# Patient Record
Sex: MALE | Race: WHITE | NOT HISPANIC OR LATINO | Employment: OTHER | URBAN - METROPOLITAN AREA
[De-identification: names, ages, dates, MRNs, and addresses within clinical notes are randomized per-mention and may not be internally consistent; named-entity substitution may affect disease eponyms.]

---

## 2023-09-28 PROBLEM — L90.5 SCAR: Status: ACTIVE | Noted: 2023-07-18

## 2023-09-28 PROBLEM — C44.41 BASAL CELL CARCINOMA OF SCALP AND SKIN OF NECK: Status: ACTIVE | Noted: 2023-07-18

## 2023-09-28 PROBLEM — F03.90 DEMENTIA (MULTI): Status: ACTIVE | Noted: 2023-09-28

## 2023-09-28 PROBLEM — D18.00 ANGIOMA: Status: ACTIVE | Noted: 2023-07-18

## 2023-09-28 PROBLEM — B02.29 POST HERPETIC NEURALGIA: Status: ACTIVE | Noted: 2023-09-28

## 2023-09-28 PROBLEM — I83.029 VENOUS STASIS ULCER OF LEFT LOWER EXTREMITY (MULTI): Status: ACTIVE | Noted: 2023-09-28

## 2023-09-28 PROBLEM — R80.9 PROTEINURIA: Status: ACTIVE | Noted: 2023-09-28

## 2023-09-28 PROBLEM — L91.8 SKIN TAG: Status: ACTIVE | Noted: 2023-07-18

## 2023-09-28 PROBLEM — L97.929 VENOUS STASIS ULCER OF LEFT LOWER EXTREMITY (MULTI): Status: ACTIVE | Noted: 2023-09-28

## 2023-09-28 PROBLEM — I10 ESSENTIAL HYPERTENSION: Status: ACTIVE | Noted: 2023-09-28

## 2023-09-28 PROBLEM — D49.9 NEOPLASM: Status: ACTIVE | Noted: 2023-07-18

## 2023-09-28 PROBLEM — L85.3 XEROSIS CUTIS: Status: ACTIVE | Noted: 2023-07-18

## 2023-09-28 PROBLEM — D49.2 NEOPLASM OF UNSPECIFIED BEHAVIOR OF BONE, SOFT TISSUE, AND SKIN: Status: ACTIVE | Noted: 2023-07-18

## 2023-09-28 PROBLEM — L81.4 LENTIGINES: Status: ACTIVE | Noted: 2023-07-18

## 2023-09-28 PROBLEM — Z85.828 PERSONAL HISTORY OF OTHER MALIGNANT NEOPLASM OF SKIN: Status: ACTIVE | Noted: 2023-07-18

## 2023-09-28 PROBLEM — D36.7 BENIGN NEOPLASM OF TRUNK: Status: ACTIVE | Noted: 2023-07-18

## 2023-09-28 PROBLEM — I87.2 STASIS DERMATITIS, ACUTE: Status: ACTIVE | Noted: 2023-09-28

## 2023-09-28 PROBLEM — L57.8 SOLAR ELASTOSIS: Status: ACTIVE | Noted: 2023-07-18

## 2023-09-28 PROBLEM — L82.1 OTHER SEBORRHEIC KERATOSIS: Status: ACTIVE | Noted: 2023-07-18

## 2023-09-28 PROBLEM — L82.1 SEBORRHEIC KERATOSIS: Status: ACTIVE | Noted: 2023-07-18

## 2023-09-28 PROBLEM — L90.5 SCAR CONDITION AND FIBROSIS OF SKIN: Status: ACTIVE | Noted: 2023-07-18

## 2023-09-28 PROBLEM — L73.8 OTHER SPECIFIED FOLLICULAR DISORDERS: Status: ACTIVE | Noted: 2023-07-18

## 2023-09-28 PROBLEM — D22.60 MELANOCYTIC NEVI OF UNSPECIFIED UPPER LIMB, INCLUDING SHOULDER: Status: ACTIVE | Noted: 2023-07-18

## 2023-09-28 PROBLEM — R73.03 PREDIABETES: Status: ACTIVE | Noted: 2023-09-28

## 2023-09-28 PROBLEM — D22.5 MELANOCYTIC NEVI OF TRUNK: Status: ACTIVE | Noted: 2023-07-18

## 2023-09-28 PROBLEM — L81.4 OTHER MELANIN HYPERPIGMENTATION: Status: ACTIVE | Noted: 2023-07-18

## 2023-09-28 PROBLEM — D48.5 NEOPLASM OF UNCERTAIN BEHAVIOR OF SKIN: Status: ACTIVE | Noted: 2023-07-18

## 2023-09-28 PROBLEM — B02.23 POST-HERPETIC POLYNEUROPATHY: Status: ACTIVE | Noted: 2023-09-28

## 2023-09-28 PROBLEM — E78.00 HYPERCHOLESTEREMIA: Status: ACTIVE | Noted: 2023-09-28

## 2023-09-28 PROBLEM — L57.0 ACTINIC KERATOSIS: Status: ACTIVE | Noted: 2023-07-18

## 2023-09-28 PROBLEM — I25.10 CAD (CORONARY ARTERY DISEASE): Status: ACTIVE | Noted: 2023-09-28

## 2023-09-28 PROBLEM — L91.8 OTHER HYPERTROPHIC DISORDERS OF THE SKIN: Status: ACTIVE | Noted: 2023-07-18

## 2023-09-28 PROBLEM — C91.10 CLL (CHRONIC LYMPHOCYTIC LEUKEMIA) (MULTI): Status: ACTIVE | Noted: 2023-09-28

## 2023-09-28 PROBLEM — C44.310 BASAL CELL CARCINOMA (BCC) OF SKIN OF FACE: Status: ACTIVE | Noted: 2023-07-18

## 2023-09-28 PROBLEM — Z86.718 HISTORY OF DVT (DEEP VEIN THROMBOSIS): Status: ACTIVE | Noted: 2023-09-28

## 2023-09-28 PROBLEM — D18.01 HEMANGIOMA OF SKIN AND SUBCUTANEOUS TISSUE: Status: ACTIVE | Noted: 2023-07-18

## 2023-09-28 PROBLEM — M19.011 PRIMARY OSTEOARTHRITIS OF RIGHT SHOULDER: Status: ACTIVE | Noted: 2023-09-28

## 2023-09-28 RX ORDER — AMMONIUM LACTATE 12 G/100G
1 CREAM TOPICAL
COMMUNITY
Start: 2018-04-27 | End: 2023-10-03 | Stop reason: ALTCHOICE

## 2023-09-28 RX ORDER — BACLOFEN 5 MG/1
5 TABLET ORAL
COMMUNITY
Start: 2023-02-20 | End: 2023-10-03 | Stop reason: ALTCHOICE

## 2023-09-28 RX ORDER — MULTIVIT WITH IRON,MINERALS
1 TABLET,CHEWABLE ORAL 2 TIMES DAILY
COMMUNITY
End: 2023-10-03 | Stop reason: ALTCHOICE

## 2023-09-28 RX ORDER — DONEPEZIL HYDROCHLORIDE 5 MG/1
1 TABLET, FILM COATED ORAL NIGHTLY
COMMUNITY
Start: 2015-02-04 | End: 2023-12-13

## 2023-09-28 RX ORDER — APIXABAN 2.5 MG/1
1 TABLET, FILM COATED ORAL 2 TIMES DAILY
COMMUNITY
Start: 2021-06-17

## 2023-09-28 RX ORDER — CLOPIDOGREL BISULFATE 75 MG/1
1 TABLET ORAL DAILY
COMMUNITY

## 2023-09-28 RX ORDER — NAPROXEN SODIUM 220 MG/1
TABLET ORAL
COMMUNITY
Start: 2020-06-18

## 2023-09-28 RX ORDER — PRAVASTATIN SODIUM 40 MG/1
40 TABLET ORAL NIGHTLY
COMMUNITY
Start: 2014-02-03

## 2023-09-28 RX ORDER — METOPROLOL SUCCINATE 50 MG/1
1 TABLET, EXTENDED RELEASE ORAL DAILY
COMMUNITY
Start: 2015-11-30

## 2023-09-28 RX ORDER — METOPROLOL SUCCINATE 25 MG/1
TABLET, EXTENDED RELEASE ORAL
COMMUNITY
Start: 2023-01-21

## 2023-09-28 RX ORDER — PREGABALIN 25 MG/1
1 CAPSULE ORAL 2 TIMES DAILY
COMMUNITY
Start: 2022-06-27

## 2023-09-28 RX ORDER — MULTIVIT-MIN/FA/LYCOPEN/LUTEIN .4-300-25
TABLET ORAL
COMMUNITY
Start: 2020-06-18

## 2023-09-28 RX ORDER — EZETIMIBE 10 MG/1
1 TABLET ORAL DAILY
COMMUNITY
Start: 2021-06-17 | End: 2023-10-03 | Stop reason: ALTCHOICE

## 2023-09-28 RX ORDER — LOSARTAN POTASSIUM 50 MG/1
1 TABLET ORAL DAILY
COMMUNITY
Start: 2019-07-17

## 2023-09-28 RX ORDER — HYDROCODONE BITARTRATE AND ACETAMINOPHEN 5; 325 MG/1; MG/1
1 TABLET ORAL 3 TIMES DAILY PRN
COMMUNITY
Start: 2022-06-27

## 2023-09-28 RX ORDER — LIDOCAINE 50 MG/G
PATCH TOPICAL DAILY
COMMUNITY
Start: 2023-02-20 | End: 2023-10-03 | Stop reason: ALTCHOICE

## 2023-09-28 RX ORDER — NITROGLYCERIN 0.3 MG/1
0.3 TABLET SUBLINGUAL AS NEEDED
COMMUNITY
Start: 2016-02-10

## 2023-10-03 ENCOUNTER — OFFICE VISIT (OUTPATIENT)
Dept: DERMATOLOGY | Facility: CLINIC | Age: 88
End: 2023-10-03
Payer: MEDICARE

## 2023-10-03 DIAGNOSIS — C44.311 BASAL CELL CARCINOMA (BCC) OF SKIN OF NOSE: ICD-10-CM

## 2023-10-03 PROCEDURE — 99214 OFFICE O/P EST MOD 30 MIN: CPT | Performed by: DERMATOLOGY

## 2023-10-03 PROCEDURE — 13152 CMPLX RPR E/N/E/L 2.6-7.5 CM: CPT | Performed by: DERMATOLOGY

## 2023-10-03 PROCEDURE — 17311 MOHS 1 STAGE H/N/HF/G: CPT | Performed by: DERMATOLOGY

## 2023-10-03 PROCEDURE — 17312 MOHS ADDL STAGE: CPT | Performed by: DERMATOLOGY

## 2023-10-03 NOTE — PROGRESS NOTES
Mohs Surgery Operative Note    Date of Surgery:  10/3/2023  Surgeon:  Irma Mccrray MD  Office Location:  [address]  [address] [facility]  [address]  [facility] [address]  Dept: [phone]  Dept Fax: [phone]  Referring Provider: Irma Mccrary MD  [address], [address]  [place] [address]      Assessment/Plan   Pre-procedure:   Obtained informed consent: written from patient    Intra-operative:   Audible time out called at : 3:44 PM 10/03/23  by: Patti Moss MA   Verified patient name, birthdate, site, specimen bottle label & requisition.    The planned procedure(s) was again reviewed with the patient. The risks of bleeding, infection, nerve damage and scarring were reviewed. Written authorization was obtained. The patient identify, surgical site, and planned procedure(s) were verified. The provider acted as both surgeon and pathologist.     Basal cell carcinoma (BCC) of skin of nose  Dorsum of Nose    Mohs surgery    Consent obtained: written    Universal Protocol:  Procedure explained and questions answered to patient or proxy's satisfaction: Yes    Test results available and properly labeled: Yes    Pathology report reviewed: Yes    External notes reviewed: Yes    Photo or diagram used for site identification: Yes    Site/side marked: Yes    Slide independently reviewed by Mohs surgeon: Yes    Immediately prior to procedure a time out was called: Yes    Patient identity confirmed: verbally with patient  Preparation: Patient was prepped and draped in usual sterile fashion      Anticoagulation:  Is the patient taking prescription anticoagulant and/or aspirin prescribed/recommended by a physician? No    Was the anticoagulation regimen changed prior to Mohs? No      Anesthesia:  Anesthesia method: local infiltration  Local anesthetic: lidocaine 1.5% WITH epi    Procedure Details:  Biopsy accession number: [id number]  Pre-Op diagnosis: basal  cell carcinoma  Surgical site (from skin exam): Dorsum of Nose  Pre-operative length (cm): 1.1  Pre-operative width (cm): 1.5  Indications for Mohs surgery: anatomic location where tissue conservation is critical  Previously treated? No      Micrographic Surgery Details:  Post-operative length (cm): 2.2  Post-operative width (cm): 1.8  Number of Mohs stages: 2    Stage 1     Comments: The patient was brought into the operating room and placed in the procedure chair in the appropriate position.  The area positive by previous biopsy was identified and confirmed with the patient. The area of clinically obvious tumor was debulked using a curette and/or scalpel as needed. An incision was made following the Mohs approach through the skin. The specimen was taken to the lab, divided into 2 piece(s) and appropriately chromacoded and processed.     Tumor features identified on Mohs section: basal carcinoma      Depth of invasion: dermis    Stage 2     Comments: The area of positivity as noted on the Mohs map in the previous stage was identified and removed using the Mohs technique. The specimen was taken to the lab and appropriately chromacoded and processed in 1 piece(s).     Tumor features identified on Mohs section: no tumor identified  Depth of defect: subcutaneous fat  Patient tolerance of procedure: tolerated well, no immediate complications    Reconstruction:  Was the defect reconstructed? Yes    Was reconstruction performed by the same Mohs surgeon? Yes    Setting of reconstruction: outpatient office  Type of reconstruction: linear  Linear reconstruction: complex  Subcutaneous Layers (Deep Stitches)   Suture size:  5-0  Suture type:  Vicryl  Fine/surface layer approximation (top stitches)   Epidermal/Superficial suture size:  5-0  Epidermal/Superficial suture type:  Fast-absorbing gut  Hemostasis achieved with: suture, pressure and electrodesiccation  Outcome: patient tolerated procedure well with no complications       Staff Communication: Dermatology Local Anesthesia: 1.5 % Lidocaine / Epinephrine - Amount: 4.8cc              The final repair measured 3.2 cm    Wound care was discussed, and the patient was given written post-operative wound care instructions.      The patient will follow up with Irma Mccrary MD as needed for any post operative problems or concerns, and will follow up with their primary dermatologist as scheduled.

## 2023-10-03 NOTE — PROGRESS NOTES
Office Visit Note  Date: 10/3/2023  Surgeon:  Irma Mccrary MD  Office Location: DO [address] [facility] [place]  [address]  [address]  [facility] [address]  Dept: [phone]  Dept Fax: [phone]  Referring Provider: Irma Mccrary MD  [address]   [address]  [place] [address]    Subjective   Munden BRITTANEY Siddiqui is a 88 y.o. male who presents for the following: MOHS Surgery    According to the patient, the lesion has been presnt for approximately 6 months at the time of diagnosis.  The lesion is itchy.  The lesion has not been treated previously.    The patient does not have a pacemaker / defibrillator.      Review of Systems:  No other skin or systemic complaints other than what is documented elsewhere in the note.    MEDICAL HISTORY: clinically relevant history including significant past medical history, medications and allergies was reviewed and documented in Epic.    Objective   Well appearing patient in no apparent distress; mood and affect are within normal limits.  Vital signs: See record.    The patient confirmed the identified site.    Discussion:  The nature of the diagnosis was explained. The lesion is a skin cancer.  It has a risk of local growth and distant spread. The condition is associated with sun exposure.  Warning signs of non-melanoma skin cancer discussed. Patient was instructed to perform monthly self skin examination.  We recommended that the patient have regular full skin exams given an increased risk of subsequent skin cancers. The patient was instructed to use sun protective behaviors including use of broad spectrum sunscreens and sun protective clothing to reduce risk of skin cancers.      Risks, benefits, side effects of Mohs surgery were discussed with patient and the patient voiced understanding.  It was explained that even though the cure rate of Mohs is very high it is not 100%. Risks of surgery including but not limited to  bleeding, infection, numbness, nerve damage, and scar were reviewed.  Discussion included wound care requirements, activity restrictions, likely scar outcome and time to heal.     After Mohs surgery, the defect may need to be repaired surgically and the scar may be longer than the original lesion.  Reconstruction options, risks, and benefits were reviewed including second intention healing, linear repair (4-1 ratio was explained), local flaps, skin grafts, cartilage grafts and interpolation flaps (the need for multiple surgeries was explained). Possible outcomes were reviewed including likely scar appearance, failure of flap survival, infection, bleeding and the need for revision surgery.     The pathology was reviewed, the photograph was reviewed, and the referring physician's note was reviewed.    Patient elected for Mohs surgery.

## 2023-12-12 DIAGNOSIS — F03.90 UNSPECIFIED DEMENTIA, UNSPECIFIED SEVERITY, WITHOUT BEHAVIORAL DISTURBANCE, PSYCHOTIC DISTURBANCE, MOOD DISTURBANCE, AND ANXIETY (MULTI): ICD-10-CM

## 2023-12-13 RX ORDER — DONEPEZIL HYDROCHLORIDE 5 MG/1
5 TABLET, FILM COATED ORAL NIGHTLY
Qty: 90 TABLET | Refills: 0 | Status: SHIPPED | OUTPATIENT
Start: 2023-12-13 | End: 2024-05-01

## 2024-05-01 DIAGNOSIS — F03.90 UNSPECIFIED DEMENTIA, UNSPECIFIED SEVERITY, WITHOUT BEHAVIORAL DISTURBANCE, PSYCHOTIC DISTURBANCE, MOOD DISTURBANCE, AND ANXIETY (MULTI): ICD-10-CM

## 2024-05-01 RX ORDER — DONEPEZIL HYDROCHLORIDE 5 MG/1
5 TABLET, FILM COATED ORAL NIGHTLY
Qty: 90 TABLET | Refills: 0 | Status: SHIPPED | OUTPATIENT
Start: 2024-05-01

## 2024-05-09 ENCOUNTER — OFFICE VISIT (OUTPATIENT)
Dept: PRIMARY CARE | Facility: CLINIC | Age: 89
End: 2024-05-09
Payer: MEDICARE

## 2024-05-09 VITALS
WEIGHT: 189 LBS | TEMPERATURE: 96 F | OXYGEN SATURATION: 96 % | HEART RATE: 50 BPM | BODY MASS INDEX: 28.74 KG/M2 | SYSTOLIC BLOOD PRESSURE: 118 MMHG | DIASTOLIC BLOOD PRESSURE: 70 MMHG

## 2024-05-09 DIAGNOSIS — M79.18 MUSCULOSKELETAL PAIN: Primary | ICD-10-CM

## 2024-05-09 DIAGNOSIS — B02.29 POST HERPETIC NEURALGIA: ICD-10-CM

## 2024-05-09 PROCEDURE — 1036F TOBACCO NON-USER: CPT

## 2024-05-09 PROCEDURE — 99213 OFFICE O/P EST LOW 20 MIN: CPT

## 2024-05-09 PROCEDURE — 1159F MED LIST DOCD IN RCRD: CPT

## 2024-05-09 PROCEDURE — 3074F SYST BP LT 130 MM HG: CPT

## 2024-05-09 PROCEDURE — 3078F DIAST BP <80 MM HG: CPT

## 2024-05-09 RX ORDER — DICLOFENAC SODIUM 10 MG/G
4 GEL TOPICAL 4 TIMES DAILY PRN
Qty: 200 G | Refills: 2 | Status: SHIPPED | OUTPATIENT
Start: 2024-05-09

## 2024-05-09 ASSESSMENT — ENCOUNTER SYMPTOMS
LOSS OF SENSATION IN FEET: 1
ENDOCRINE NEGATIVE: 1
HEADACHES: 0
CARDIOVASCULAR NEGATIVE: 1
FATIGUE: 0
ACTIVITY CHANGE: 0
FEVER: 0
DEPRESSION: 0
ABDOMINAL PAIN: 0
RESPIRATORY NEGATIVE: 1
GASTROINTESTINAL NEGATIVE: 1
WEAKNESS: 0
MUSCULOSKELETAL NEGATIVE: 1
SHORTNESS OF BREATH: 0
OCCASIONAL FEELINGS OF UNSTEADINESS: 0
DECREASED CONCENTRATION: 1
CONFUSION: 1
UNEXPECTED WEIGHT CHANGE: 0
DIZZINESS: 0
ALLERGIC/IMMUNOLOGIC NEGATIVE: 1

## 2024-05-09 NOTE — PROGRESS NOTES
Subjective   Patient ID: Talib Siddiqui is a 89 y.o. male who presents for Establish Care (Talib is here to establish care. He has post herpes neuralgia  and is constant pain, and was told there was nothing to do to help with this.  Medications they did give caused more issues with his dementia. He would like to discuss options to help with pain under the arms and back. ).  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  --trial voltaren gel    Dementia  --patient tells same story three times about his job  --progressing per wife  --continues to drive at this time, it is my professional opinion patient is not safe to drive.      Vitals:    05/09/24 1152   BP: 118/70   Pulse: 50   Temp: 35.6 °C (96 °F)   SpO2: 96%       Review of Systems   Constitutional:  Negative for activity change, fatigue, fever and unexpected weight change.   HENT: Negative.     Respiratory: Negative.  Negative for shortness of breath.    Cardiovascular: Negative.  Negative for chest pain.   Gastrointestinal: Negative.  Negative for abdominal pain.   Endocrine: Negative.    Musculoskeletal: Negative.    Skin: Negative.    Allergic/Immunologic: Negative.    Neurological:  Negative for dizziness, weakness and headaches.   Psychiatric/Behavioral:  Positive for confusion and decreased concentration.        Objective   Physical Exam  Vitals and nursing note reviewed.   Constitutional:       Appearance: Normal appearance.   Cardiovascular:      Rate and Rhythm: Normal rate and regular rhythm.      Pulses: Normal pulses.      Heart sounds: No murmur heard.  Pulmonary:      Effort: Pulmonary effort is normal. No respiratory distress.   Neurological:      Mental Status: He is alert.       Assessment/Plan   Problem List Items Addressed This Visit       Post herpetic neuralgia    Relevant Medications    diclofenac sodium (Voltaren) 1 % gel     Other  Visit Diagnoses       Musculoskeletal pain    -  Primary                 Thank you for coming in today, please call my office if you have any concerns or questions.     Gregory ROBB, CNP

## 2024-05-09 NOTE — PATIENT INSTRUCTIONS
Trial of Voltaren Gel for the PHN  Continue to see the cardiologist    Thank you for coming in today, if any questions or concerns arise, please call my office.   CLARISSE Atwood-CNP

## 2024-08-01 DIAGNOSIS — F03.90 UNSPECIFIED DEMENTIA, UNSPECIFIED SEVERITY, WITHOUT BEHAVIORAL DISTURBANCE, PSYCHOTIC DISTURBANCE, MOOD DISTURBANCE, AND ANXIETY (MULTI): ICD-10-CM

## 2024-08-02 DIAGNOSIS — F03.90 UNSPECIFIED DEMENTIA, UNSPECIFIED SEVERITY, WITHOUT BEHAVIORAL DISTURBANCE, PSYCHOTIC DISTURBANCE, MOOD DISTURBANCE, AND ANXIETY (MULTI): ICD-10-CM

## 2024-08-02 RX ORDER — DONEPEZIL HYDROCHLORIDE 5 MG/1
5 TABLET, FILM COATED ORAL NIGHTLY
Qty: 90 TABLET | Refills: 0 | OUTPATIENT
Start: 2024-08-02

## 2024-08-02 RX ORDER — DONEPEZIL HYDROCHLORIDE 5 MG/1
5 TABLET, FILM COATED ORAL NIGHTLY
Qty: 90 TABLET | Refills: 0 | Status: SHIPPED | OUTPATIENT
Start: 2024-08-02

## 2024-08-12 ENCOUNTER — APPOINTMENT (OUTPATIENT)
Dept: PRIMARY CARE | Facility: CLINIC | Age: 89
End: 2024-08-12
Payer: MEDICARE

## 2024-08-12 VITALS
DIASTOLIC BLOOD PRESSURE: 80 MMHG | TEMPERATURE: 96 F | HEART RATE: 68 BPM | BODY MASS INDEX: 28.14 KG/M2 | OXYGEN SATURATION: 96 % | WEIGHT: 185 LBS | SYSTOLIC BLOOD PRESSURE: 128 MMHG

## 2024-08-12 DIAGNOSIS — B02.29 POST HERPETIC NEURALGIA: Primary | ICD-10-CM

## 2024-08-12 PROCEDURE — 3079F DIAST BP 80-89 MM HG: CPT

## 2024-08-12 PROCEDURE — 3074F SYST BP LT 130 MM HG: CPT

## 2024-08-12 PROCEDURE — 99214 OFFICE O/P EST MOD 30 MIN: CPT

## 2024-08-12 RX ORDER — DICLOFENAC SODIUM 75 MG/1
75 TABLET, DELAYED RELEASE ORAL 2 TIMES DAILY PRN
Qty: 180 TABLET | Refills: 0 | Status: SHIPPED | OUTPATIENT
Start: 2024-08-12 | End: 2024-11-10

## 2024-08-12 ASSESSMENT — ENCOUNTER SYMPTOMS
ACTIVITY CHANGE: 0
ARTHRALGIAS: 1

## 2024-08-12 NOTE — PROGRESS NOTES
Subjective   Patient ID: Talib Siddiqui is a 89 y.o. male who presents for Follow-up (Talib is here for shingles pain. Was seeing pain management, due to the dementia they advised there was not much to do. Did tricyclic which caused hallucinations. The gel does not last long. ).  Patient presents today with son chief complaint - continued PHN    2022 received T6 Nerve block 40mg Kenalog with 2CC of Lidocaine.    Previously did take Lyrica in 2023 last March  He has been denied gabapentin, TCA, SNRI for concern of possible worsening of his dementia, I agree with those findings.  Patient is compliant with taking Voltaren gel, which does help for short intervals  We will start Diclofenac PO and monitor symptoms.        Vitals:    08/12/24 1315   BP: 128/80   Pulse: 68   Temp: 35.6 °C (96 °F)   SpO2: 96%       Review of Systems   Constitutional:  Negative for activity change.   Musculoskeletal:  Positive for arthralgias.       Objective   Physical Exam    Assessment/Plan   Problem List Items Addressed This Visit       Post herpetic neuralgia - Primary    Relevant Medications    diclofenac (Voltaren) 75 mg EC tablet    Other Relevant Orders    Referral to Pain Medicine            Thank you for coming in today, please call my office if you have any concerns or questions.     Gregory ROBB, CNP

## 2024-08-12 NOTE — PATIENT INSTRUCTIONS
Since Voltaren worked well, we will start Diclofenac pills  Continue the gel  STOP Tylenol    See the pain manager Dr. Garrett    Thank you for coming in today, if any questions or concerns arise, please call my office.   CLARISSE Atwood-CNP

## 2024-09-04 ENCOUNTER — OFFICE VISIT (OUTPATIENT)
Dept: PAIN MEDICINE | Facility: CLINIC | Age: 89
End: 2024-09-04
Payer: MEDICARE

## 2024-09-04 ENCOUNTER — PREP FOR PROCEDURE (OUTPATIENT)
Dept: PAIN MEDICINE | Facility: CLINIC | Age: 89
End: 2024-09-04
Payer: MEDICARE

## 2024-09-04 VITALS
RESPIRATION RATE: 22 BRPM | WEIGHT: 185 LBS | DIASTOLIC BLOOD PRESSURE: 64 MMHG | HEIGHT: 67 IN | SYSTOLIC BLOOD PRESSURE: 150 MMHG | BODY MASS INDEX: 29.03 KG/M2 | OXYGEN SATURATION: 98 % | HEART RATE: 57 BPM

## 2024-09-04 DIAGNOSIS — B02.29 POST HERPETIC NEURALGIA: ICD-10-CM

## 2024-09-04 DIAGNOSIS — G58.8 INTERCOSTAL NEURALGIA: Primary | ICD-10-CM

## 2024-09-04 PROCEDURE — 1159F MED LIST DOCD IN RCRD: CPT | Performed by: ANESTHESIOLOGY

## 2024-09-04 PROCEDURE — 99204 OFFICE O/P NEW MOD 45 MIN: CPT | Performed by: ANESTHESIOLOGY

## 2024-09-04 PROCEDURE — 3077F SYST BP >= 140 MM HG: CPT | Performed by: ANESTHESIOLOGY

## 2024-09-04 PROCEDURE — 3078F DIAST BP <80 MM HG: CPT | Performed by: ANESTHESIOLOGY

## 2024-09-04 PROCEDURE — 1036F TOBACCO NON-USER: CPT | Performed by: ANESTHESIOLOGY

## 2024-09-04 PROCEDURE — 1125F AMNT PAIN NOTED PAIN PRSNT: CPT | Performed by: ANESTHESIOLOGY

## 2024-09-04 PROCEDURE — 99214 OFFICE O/P EST MOD 30 MIN: CPT | Performed by: ANESTHESIOLOGY

## 2024-09-04 RX ORDER — EZETIMIBE 10 MG/1
10 TABLET ORAL DAILY
COMMUNITY

## 2024-09-04 ASSESSMENT — ENCOUNTER SYMPTOMS
CONFUSION: 1
BACK PAIN: 1
ACTIVITY CHANGE: 1
ARTHRALGIAS: 1

## 2024-09-04 ASSESSMENT — PAIN SCALES - GENERAL
PAINLEVEL: 9
PAINLEVEL_OUTOF10: 9

## 2024-09-04 ASSESSMENT — LIFESTYLE VARIABLES
HOW MANY STANDARD DRINKS CONTAINING ALCOHOL DO YOU HAVE ON A TYPICAL DAY: PATIENT DOES NOT DRINK
SKIP TO QUESTIONS 9-10: 1
HOW OFTEN DO YOU HAVE A DRINK CONTAINING ALCOHOL: NEVER
AUDIT-C TOTAL SCORE: 0
HOW OFTEN DO YOU HAVE SIX OR MORE DRINKS ON ONE OCCASION: NEVER

## 2024-09-04 ASSESSMENT — PAIN DESCRIPTION - DESCRIPTORS: DESCRIPTORS: BURNING

## 2024-09-04 ASSESSMENT — PAIN - FUNCTIONAL ASSESSMENT: PAIN_FUNCTIONAL_ASSESSMENT: 0-10

## 2024-09-04 ASSESSMENT — PATIENT HEALTH QUESTIONNAIRE - PHQ9
SUM OF ALL RESPONSES TO PHQ9 QUESTIONS 1 & 2: 0
1. LITTLE INTEREST OR PLEASURE IN DOING THINGS: NOT AT ALL
2. FEELING DOWN, DEPRESSED OR HOPELESS: NOT AT ALL

## 2024-09-04 NOTE — PROGRESS NOTES
The patient is an 89-year-old male with right-sided chest wall pain.  The patient developed shingles approximately 3 years ago the pain radiated from the mid thoracic region around the right side under his pectoral muscle to the midline.  The lesions healed for the most part but the patient developed severe burning pain that has persisted.  He has tried a variety of medications including pregabalin, oral and topical diclofenac and over-the-counter topical medications.  These have been ineffective.  Lidocaine patches actually made the pain worse.  He underwent an epidural steroid injection by Kimberly Botello which was ineffective.  His son asked about an intercostal nerve block.    Review of Systems   Constitutional:  Positive for activity change.   Musculoskeletal:  Positive for arthralgias and back pain.   Psychiatric/Behavioral:  Positive for confusion.    All other systems reviewed and are negative.    GENERAL: alert and appropriate, poor memory, in no distress, well-hydrated, well nourished, interactive         SKIN: Faint rash in the T6 distribution on the right         HEAD: normocephalic, no abnormality or lesion noted         EYES: no injection and visual acuity is grossly normal         EARS: external ears normal, no mastoid tenderness         NOSE: external nose normal without rhinorrhea         OROPHARYNX: moist mucus membranes, no tonsillar hypertrophy/exudate, uvula midline and pharynx non-erythematous, lips, teeth and gums are without obvious lesion         NECK: Reduced ROM, no cervical LNs noted         RESPIRATORY: breathing non-labored and no grunting/flaring/retractions         CHEST: equal chest rise with normal respiratory effort         ABDOMEN: soft and non-tender         BACK: back normal in appearance, cervical and lumbar spine with reduced ROM         EXTREMITIES: strength intact         NEUROLOGIC: gait antalgic,  allodynia in the T6 distribution on the right    Assessment and  Plan    -Chronicity--chronic neuropathic pain    -Diagnostics--no new imaging ordered    -Pharmacologic--we may consider duloxetine in the future given its relatively benign side effect profile    -Psychologic--no need for psychologic intervention from my standpoint.  There are no mental health issues of which I am aware that are contributing to the patient's pain.  There are no substance abuse or alcohol abuse issues of which I am aware that are contributing to the patient's pain.    -Physical--we discussed the importance of physical therapy and exercise.  We discussed avoidance and modification techniques.    -Intervention--the patient is a candidate for an intercostal nerve block on the right at the T5, T6 and T7 levels.  I explained the risks benefits and alternatives of the procedure to the patient.  The patient wishes to proceed.    I spent time educating the patient on the condition including the treatment and the prognosis.  I invited the patient to call at anytime with any questions.

## 2024-09-05 ENCOUNTER — ANCILLARY PROCEDURE (OUTPATIENT)
Dept: RADIOLOGY | Facility: EXTERNAL LOCATION | Age: 89
End: 2024-09-05
Payer: MEDICARE

## 2024-09-05 DIAGNOSIS — B02.29 OTHER POSTHERPETIC NERVOUS SYSTEM INVOLVEMENT: ICD-10-CM

## 2024-09-05 PROCEDURE — 64421 NJX AA&/STRD NTRCOST NRV EA: CPT | Performed by: ANESTHESIOLOGY

## 2024-09-05 PROCEDURE — 64420 NJX AA&/STRD NTRCOST NRV 1: CPT | Performed by: ANESTHESIOLOGY

## 2024-09-05 NOTE — PROGRESS NOTES
Pre and postprocedure diagnosis--intercostal neuralgia    Procedure--intercostal nerve blocks on the right at T5, T6 and T7    Anesthesia--local    Complications--none    Clinical note--the patient has a history of pain.  I explained the risks, benefits, and alternatives of the procedure to the patient.  The patient wishes to proceed.    Procedure Note--The patient was brought to the procedure room and placed in prone position.  Sterile prep and drape with ChloraPrep and a fenestrated drape.  10 mL of half percent lidocaine were injected through a 25-gauge spinal needle for local anesthesia.  22-gauge spinal needles were guided to the T5, T6 and T7 intercostal nerves on the right under fluoroscopic guidance.  Contrast was injected under live fluoroscopy to ensure proper needle placement.  Then 40 mg of triamcinolone and 3 mL of half percent bupivacaine were injected through the needles in divided doses.  The needles were removed and the patient was transferred to recovery.

## 2024-10-06 DIAGNOSIS — F03.90 UNSPECIFIED DEMENTIA, UNSPECIFIED SEVERITY, WITHOUT BEHAVIORAL DISTURBANCE, PSYCHOTIC DISTURBANCE, MOOD DISTURBANCE, AND ANXIETY: ICD-10-CM

## 2024-10-08 RX ORDER — DONEPEZIL HYDROCHLORIDE 5 MG/1
5 TABLET, FILM COATED ORAL NIGHTLY
Qty: 90 TABLET | Refills: 0 | Status: SHIPPED | OUTPATIENT
Start: 2024-10-08

## 2024-11-20 ENCOUNTER — OFFICE VISIT (OUTPATIENT)
Dept: PAIN MEDICINE | Facility: CLINIC | Age: 89
End: 2024-11-20
Payer: MEDICARE

## 2024-11-20 VITALS
DIASTOLIC BLOOD PRESSURE: 70 MMHG | WEIGHT: 185 LBS | HEIGHT: 67 IN | RESPIRATION RATE: 22 BRPM | BODY MASS INDEX: 29.03 KG/M2 | OXYGEN SATURATION: 97 % | SYSTOLIC BLOOD PRESSURE: 130 MMHG | HEART RATE: 56 BPM

## 2024-11-20 DIAGNOSIS — G58.8 INTERCOSTAL NEURALGIA: Primary | ICD-10-CM

## 2024-11-20 PROCEDURE — 3075F SYST BP GE 130 - 139MM HG: CPT | Performed by: ANESTHESIOLOGY

## 2024-11-20 PROCEDURE — 1159F MED LIST DOCD IN RCRD: CPT | Performed by: ANESTHESIOLOGY

## 2024-11-20 PROCEDURE — 3078F DIAST BP <80 MM HG: CPT | Performed by: ANESTHESIOLOGY

## 2024-11-20 PROCEDURE — 99417 PROLNG OP E/M EACH 15 MIN: CPT | Performed by: ANESTHESIOLOGY

## 2024-11-20 PROCEDURE — 99214 OFFICE O/P EST MOD 30 MIN: CPT | Performed by: ANESTHESIOLOGY

## 2024-11-20 PROCEDURE — 1125F AMNT PAIN NOTED PAIN PRSNT: CPT | Performed by: ANESTHESIOLOGY

## 2024-11-20 PROCEDURE — 1036F TOBACCO NON-USER: CPT | Performed by: ANESTHESIOLOGY

## 2024-11-20 RX ORDER — DULOXETIN HYDROCHLORIDE 30 MG/1
30 CAPSULE, DELAYED RELEASE ORAL DAILY
Qty: 30 CAPSULE | Refills: 2 | Status: ON HOLD | OUTPATIENT
Start: 2024-11-20 | End: 2025-11-20

## 2024-11-20 ASSESSMENT — ENCOUNTER SYMPTOMS
BACK PAIN: 1
ACTIVITY CHANGE: 1
ARTHRALGIAS: 1

## 2024-11-20 ASSESSMENT — PAIN SCALES - GENERAL
PAINLEVEL_OUTOF10: 8
PAINLEVEL_OUTOF10: 8

## 2024-11-20 ASSESSMENT — PAIN - FUNCTIONAL ASSESSMENT: PAIN_FUNCTIONAL_ASSESSMENT: 0-10

## 2024-11-20 NOTE — PROGRESS NOTES
The patient is an 89-year-old male with pain on the right side of the chest wall.  He has had this pain for at least 3 years.  The patient underwent an epidural steroid injection by another provider which was ineffective.  I did an intercostal nerve block on the right at T5, T6 and T7 which also was ineffective.  He seems to get some relief with use of diclofenac.  He did not respond to low-dose pregabalin.  Amitriptyline caused hallucinations.  He has never tried duloxetine.    Review of Systems   Constitutional:  Positive for activity change.   Musculoskeletal:  Positive for arthralgias, back pain and gait problem.   All other systems reviewed and are negative.    GENERAL: alert and appropriate, in no distress, well-hydrated, well-nourished and happy, smiling, interactive  SKIN: no rash noted  RESPIRATORY: breathing non-labored and no grunting/flaring/retractions  CHEST: equal chest rise with normal respiratory effort  ABDOMEN: soft and non-tender  BACK: back normal in appearance, spine with reduced ROM  EXTREMITIES: strength intact  NEUROLOGIC: gait antalgic, SLR negative, sensation grossly intact.    Assessment and Plan    -Chronicity--chronic neuropathic pain    -Diagnostics--no new imaging ordered    -Pharmacologic--the patient may benefit from duloxetine.  I explained the mechanism of action of this medication as well as the side effect profile.    -Psychologic--no need for psychologic intervention from my standpoint.  There are no mental health issues of which I am aware that are contributing to the patient's pain.  There are no substance abuse or alcohol abuse issues of which I am aware that are contributing to the patient's pain.    -Physical--we discussed the importance of physical therapy and exercise.  We discussed avoidance and modification techniques.    -Intervention--no intervention planned at this time    I spent time educating the patient on the condition including the treatment and the prognosis.   I invited the patient to call at anytime with any questions.

## 2024-11-24 ENCOUNTER — APPOINTMENT (OUTPATIENT)
Dept: CARDIOLOGY | Facility: HOSPITAL | Age: 89
End: 2024-11-24
Payer: MEDICARE

## 2024-11-24 ENCOUNTER — HOSPITAL ENCOUNTER (INPATIENT)
Facility: HOSPITAL | Age: 89
End: 2024-11-24
Attending: FAMILY MEDICINE | Admitting: FAMILY MEDICINE
Payer: MEDICARE

## 2024-11-24 ENCOUNTER — APPOINTMENT (OUTPATIENT)
Dept: RADIOLOGY | Facility: HOSPITAL | Age: 89
End: 2024-11-24
Payer: MEDICARE

## 2024-11-24 VITALS
HEART RATE: 87 BPM | WEIGHT: 172.62 LBS | HEIGHT: 67 IN | BODY MASS INDEX: 27.09 KG/M2 | SYSTOLIC BLOOD PRESSURE: 128 MMHG | TEMPERATURE: 97.9 F | DIASTOLIC BLOOD PRESSURE: 69 MMHG | OXYGEN SATURATION: 94 % | RESPIRATION RATE: 18 BRPM

## 2024-11-24 DIAGNOSIS — R33.9 URINARY RETENTION: ICD-10-CM

## 2024-11-24 DIAGNOSIS — C61 PROSTATE CANCER METASTATIC TO BONE (MULTI): ICD-10-CM

## 2024-11-24 DIAGNOSIS — G89.3 PAIN OF METASTATIC MALIGNANCY: Primary | ICD-10-CM

## 2024-11-24 DIAGNOSIS — C79.51 PROSTATE CANCER METASTATIC TO BONE (MULTI): ICD-10-CM

## 2024-11-24 LAB
ALBUMIN SERPL BCP-MCNC: 4 G/DL (ref 3.4–5)
ALP SERPL-CCNC: 133 U/L (ref 33–136)
ALT SERPL W P-5'-P-CCNC: 22 U/L (ref 10–52)
ANION GAP SERPL CALC-SCNC: 15 MMOL/L (ref 10–20)
APPEARANCE UR: CLEAR
AST SERPL W P-5'-P-CCNC: 44 U/L (ref 9–39)
BASOPHILS # BLD AUTO: 0.08 X10*3/UL (ref 0–0.1)
BASOPHILS NFR BLD AUTO: 0.4 %
BILIRUB SERPL-MCNC: 0.7 MG/DL (ref 0–1.2)
BILIRUB UR STRIP.AUTO-MCNC: NEGATIVE MG/DL
BUN SERPL-MCNC: 17 MG/DL (ref 6–23)
CALCIUM SERPL-MCNC: 9 MG/DL (ref 8.6–10.3)
CARDIAC TROPONIN I PNL SERPL HS: 44 NG/L (ref 0–20)
CARDIAC TROPONIN I PNL SERPL HS: 47 NG/L (ref 0–20)
CHLORIDE SERPL-SCNC: 102 MMOL/L (ref 98–107)
CO2 SERPL-SCNC: 25 MMOL/L (ref 21–32)
COLOR UR: YELLOW
CREAT SERPL-MCNC: 0.96 MG/DL (ref 0.5–1.3)
EGFRCR SERPLBLD CKD-EPI 2021: 76 ML/MIN/1.73M*2
EOSINOPHIL # BLD AUTO: 0.05 X10*3/UL (ref 0–0.4)
EOSINOPHIL NFR BLD AUTO: 0.3 %
ERYTHROCYTE [DISTWIDTH] IN BLOOD BY AUTOMATED COUNT: 13.6 % (ref 11.5–14.5)
GLUCOSE SERPL-MCNC: 95 MG/DL (ref 74–99)
GLUCOSE UR STRIP.AUTO-MCNC: NORMAL MG/DL
HCT VFR BLD AUTO: 37.9 % (ref 41–52)
HGB BLD-MCNC: 12.4 G/DL (ref 13.5–17.5)
IMM GRANULOCYTES # BLD AUTO: 0.06 X10*3/UL (ref 0–0.5)
IMM GRANULOCYTES NFR BLD AUTO: 0.3 % (ref 0–0.9)
INR PPP: 1.3 (ref 0.9–1.1)
KETONES UR STRIP.AUTO-MCNC: NEGATIVE MG/DL
LEUKOCYTE ESTERASE UR QL STRIP.AUTO: NEGATIVE
LYMPHOCYTES # BLD AUTO: 7.53 X10*3/UL (ref 0.8–3)
LYMPHOCYTES NFR BLD AUTO: 42 %
MCH RBC QN AUTO: 30.6 PG (ref 26–34)
MCHC RBC AUTO-ENTMCNC: 32.7 G/DL (ref 32–36)
MCV RBC AUTO: 94 FL (ref 80–100)
MONOCYTES # BLD AUTO: 1.07 X10*3/UL (ref 0.05–0.8)
MONOCYTES NFR BLD AUTO: 6 %
MUCOUS THREADS #/AREA URNS AUTO: ABNORMAL /LPF
NEUTROPHILS # BLD AUTO: 9.14 X10*3/UL (ref 1.6–5.5)
NEUTROPHILS NFR BLD AUTO: 51 %
NITRITE UR QL STRIP.AUTO: NEGATIVE
NRBC BLD-RTO: 0 /100 WBCS (ref 0–0)
PH UR STRIP.AUTO: 5.5 [PH]
PLATELET # BLD AUTO: 210 X10*3/UL (ref 150–450)
POTASSIUM SERPL-SCNC: 4.1 MMOL/L (ref 3.5–5.3)
PROT SERPL-MCNC: 6.6 G/DL (ref 6.4–8.2)
PROT UR STRIP.AUTO-MCNC: ABNORMAL MG/DL
PROTHROMBIN TIME: 14.6 SECONDS (ref 9.8–12.8)
RBC # BLD AUTO: 4.05 X10*6/UL (ref 4.5–5.9)
RBC # UR STRIP.AUTO: ABNORMAL /UL
RBC #/AREA URNS AUTO: >20 /HPF
SODIUM SERPL-SCNC: 138 MMOL/L (ref 136–145)
SP GR UR STRIP.AUTO: 1.03
UROBILINOGEN UR STRIP.AUTO-MCNC: NORMAL MG/DL
WBC # BLD AUTO: 17.9 X10*3/UL (ref 4.4–11.3)
WBC #/AREA URNS AUTO: ABNORMAL /HPF

## 2024-11-24 PROCEDURE — 96376 TX/PRO/DX INJ SAME DRUG ADON: CPT

## 2024-11-24 PROCEDURE — 84484 ASSAY OF TROPONIN QUANT: CPT | Performed by: PHYSICIAN ASSISTANT

## 2024-11-24 PROCEDURE — 85610 PROTHROMBIN TIME: CPT | Performed by: PHYSICIAN ASSISTANT

## 2024-11-24 PROCEDURE — 2500000005 HC RX 250 GENERAL PHARMACY W/O HCPCS: Performed by: NURSE PRACTITIONER

## 2024-11-24 PROCEDURE — 2500000001 HC RX 250 WO HCPCS SELF ADMINISTERED DRUGS (ALT 637 FOR MEDICARE OP): Performed by: NURSE PRACTITIONER

## 2024-11-24 PROCEDURE — 74177 CT ABD & PELVIS W/CONTRAST: CPT

## 2024-11-24 PROCEDURE — 81001 URINALYSIS AUTO W/SCOPE: CPT | Performed by: PHYSICIAN ASSISTANT

## 2024-11-24 PROCEDURE — 2500000005 HC RX 250 GENERAL PHARMACY W/O HCPCS: Performed by: EMERGENCY MEDICINE

## 2024-11-24 PROCEDURE — 99285 EMERGENCY DEPT VISIT HI MDM: CPT | Mod: 25

## 2024-11-24 PROCEDURE — 36415 COLL VENOUS BLD VENIPUNCTURE: CPT | Performed by: PHYSICIAN ASSISTANT

## 2024-11-24 PROCEDURE — 96375 TX/PRO/DX INJ NEW DRUG ADDON: CPT

## 2024-11-24 PROCEDURE — 51798 US URINE CAPACITY MEASURE: CPT

## 2024-11-24 PROCEDURE — 71260 CT THORAX DX C+: CPT | Mod: RCN | Performed by: RADIOLOGY

## 2024-11-24 PROCEDURE — 99223 1ST HOSP IP/OBS HIGH 75: CPT | Performed by: NURSE PRACTITIONER

## 2024-11-24 PROCEDURE — 93005 ELECTROCARDIOGRAM TRACING: CPT

## 2024-11-24 PROCEDURE — 72128 CT CHEST SPINE W/O DYE: CPT | Mod: RCN | Performed by: RADIOLOGY

## 2024-11-24 PROCEDURE — 85025 COMPLETE CBC W/AUTO DIFF WBC: CPT | Performed by: PHYSICIAN ASSISTANT

## 2024-11-24 PROCEDURE — 70450 CT HEAD/BRAIN W/O DYE: CPT

## 2024-11-24 PROCEDURE — 72128 CT CHEST SPINE W/O DYE: CPT | Mod: RCN

## 2024-11-24 PROCEDURE — 72131 CT LUMBAR SPINE W/O DYE: CPT | Mod: RCN

## 2024-11-24 PROCEDURE — 2500000002 HC RX 250 W HCPCS SELF ADMINISTERED DRUGS (ALT 637 FOR MEDICARE OP, ALT 636 FOR OP/ED): Performed by: NURSE PRACTITIONER

## 2024-11-24 PROCEDURE — 51702 INSERT TEMP BLADDER CATH: CPT

## 2024-11-24 PROCEDURE — 2500000004 HC RX 250 GENERAL PHARMACY W/ HCPCS (ALT 636 FOR OP/ED): Performed by: NURSE PRACTITIONER

## 2024-11-24 PROCEDURE — 72125 CT NECK SPINE W/O DYE: CPT

## 2024-11-24 PROCEDURE — 74177 CT ABD & PELVIS W/CONTRAST: CPT | Mod: RCN | Performed by: RADIOLOGY

## 2024-11-24 PROCEDURE — 84450 TRANSFERASE (AST) (SGOT): CPT | Performed by: PHYSICIAN ASSISTANT

## 2024-11-24 PROCEDURE — 87040 BLOOD CULTURE FOR BACTERIA: CPT | Mod: GEALAB | Performed by: PHYSICIAN ASSISTANT

## 2024-11-24 PROCEDURE — 84484 ASSAY OF TROPONIN QUANT: CPT | Performed by: FAMILY MEDICINE

## 2024-11-24 PROCEDURE — 1200000002 HC GENERAL ROOM WITH TELEMETRY DAILY

## 2024-11-24 PROCEDURE — 72125 CT NECK SPINE W/O DYE: CPT | Performed by: RADIOLOGY

## 2024-11-24 PROCEDURE — 72131 CT LUMBAR SPINE W/O DYE: CPT | Mod: RCN | Performed by: RADIOLOGY

## 2024-11-24 PROCEDURE — 2550000001 HC RX 255 CONTRASTS: Performed by: PHYSICIAN ASSISTANT

## 2024-11-24 PROCEDURE — 70450 CT HEAD/BRAIN W/O DYE: CPT | Performed by: RADIOLOGY

## 2024-11-24 PROCEDURE — 2500000004 HC RX 250 GENERAL PHARMACY W/ HCPCS (ALT 636 FOR OP/ED): Performed by: PHYSICIAN ASSISTANT

## 2024-11-24 PROCEDURE — 96374 THER/PROPH/DIAG INJ IV PUSH: CPT

## 2024-11-24 RX ORDER — PRAVASTATIN SODIUM 40 MG/1
40 TABLET ORAL NIGHTLY
Status: DISCONTINUED | OUTPATIENT
Start: 2024-11-24 | End: 2024-11-27 | Stop reason: HOSPADM

## 2024-11-24 RX ORDER — ENOXAPARIN SODIUM 100 MG/ML
40 INJECTION SUBCUTANEOUS EVERY 24 HOURS
Status: DISCONTINUED | OUTPATIENT
Start: 2024-11-24 | End: 2024-11-27 | Stop reason: HOSPADM

## 2024-11-24 RX ORDER — METOPROLOL SUCCINATE 25 MG/1
25 TABLET, EXTENDED RELEASE ORAL DAILY
Status: DISCONTINUED | OUTPATIENT
Start: 2024-11-24 | End: 2024-11-24 | Stop reason: SDUPTHER

## 2024-11-24 RX ORDER — LOSARTAN POTASSIUM 50 MG/1
50 TABLET ORAL DAILY
Status: DISCONTINUED | OUTPATIENT
Start: 2024-11-24 | End: 2024-11-27 | Stop reason: HOSPADM

## 2024-11-24 RX ORDER — DONEPEZIL HYDROCHLORIDE 5 MG/1
5 TABLET, FILM COATED ORAL NIGHTLY
Status: DISCONTINUED | OUTPATIENT
Start: 2024-11-24 | End: 2024-11-27 | Stop reason: HOSPADM

## 2024-11-24 RX ORDER — EZETIMIBE 10 MG/1
10 TABLET ORAL DAILY
Status: DISCONTINUED | OUTPATIENT
Start: 2024-11-24 | End: 2024-11-27 | Stop reason: HOSPADM

## 2024-11-24 RX ORDER — TALC
3 POWDER (GRAM) TOPICAL NIGHTLY
Status: DISCONTINUED | OUTPATIENT
Start: 2024-11-24 | End: 2024-11-27 | Stop reason: HOSPADM

## 2024-11-24 RX ORDER — ONDANSETRON HYDROCHLORIDE 2 MG/ML
4 INJECTION, SOLUTION INTRAVENOUS EVERY 8 HOURS PRN
Status: DISCONTINUED | OUTPATIENT
Start: 2024-11-24 | End: 2024-11-27 | Stop reason: HOSPADM

## 2024-11-24 RX ORDER — ONDANSETRON HYDROCHLORIDE 2 MG/ML
4 INJECTION, SOLUTION INTRAVENOUS ONCE
Status: COMPLETED | OUTPATIENT
Start: 2024-11-24 | End: 2024-11-24

## 2024-11-24 RX ORDER — CLOPIDOGREL BISULFATE 75 MG/1
75 TABLET ORAL DAILY
Status: DISCONTINUED | OUTPATIENT
Start: 2024-11-24 | End: 2024-11-27 | Stop reason: HOSPADM

## 2024-11-24 RX ORDER — METOPROLOL SUCCINATE 50 MG/1
50 TABLET, EXTENDED RELEASE ORAL DAILY
Status: DISCONTINUED | OUTPATIENT
Start: 2024-11-24 | End: 2024-11-27 | Stop reason: HOSPADM

## 2024-11-24 RX ORDER — OXYCODONE HYDROCHLORIDE 5 MG/1
5 TABLET ORAL EVERY 4 HOURS PRN
Status: DISCONTINUED | OUTPATIENT
Start: 2024-11-24 | End: 2024-11-27 | Stop reason: HOSPADM

## 2024-11-24 RX ORDER — POLYETHYLENE GLYCOL 3350 17 G/17G
17 POWDER, FOR SOLUTION ORAL DAILY
Status: DISCONTINUED | OUTPATIENT
Start: 2024-11-24 | End: 2024-11-27 | Stop reason: HOSPADM

## 2024-11-24 RX ORDER — LIDOCAINE HYDROCHLORIDE 20 MG/ML
1 JELLY TOPICAL ONCE
Status: COMPLETED | OUTPATIENT
Start: 2024-11-24 | End: 2024-11-24

## 2024-11-24 RX ORDER — PANTOPRAZOLE SODIUM 40 MG/1
40 TABLET, DELAYED RELEASE ORAL
Status: DISCONTINUED | OUTPATIENT
Start: 2024-11-25 | End: 2024-11-27 | Stop reason: HOSPADM

## 2024-11-24 RX ORDER — MORPHINE SULFATE 4 MG/ML
4 INJECTION INTRAVENOUS ONCE
Status: COMPLETED | OUTPATIENT
Start: 2024-11-24 | End: 2024-11-24

## 2024-11-24 RX ORDER — MORPHINE SULFATE 2 MG/ML
2 INJECTION, SOLUTION INTRAMUSCULAR; INTRAVENOUS EVERY 4 HOURS PRN
Status: DISCONTINUED | OUTPATIENT
Start: 2024-11-24 | End: 2024-11-25

## 2024-11-24 RX ORDER — NITROGLYCERIN 0.4 MG/1
0.4 TABLET SUBLINGUAL AS NEEDED
Status: DISCONTINUED | OUTPATIENT
Start: 2024-11-24 | End: 2024-11-27 | Stop reason: HOSPADM

## 2024-11-24 RX ORDER — OXYCODONE HYDROCHLORIDE 10 MG/1
10 TABLET ORAL EVERY 4 HOURS PRN
Status: DISCONTINUED | OUTPATIENT
Start: 2024-11-24 | End: 2024-11-27 | Stop reason: HOSPADM

## 2024-11-24 RX ORDER — ACETAMINOPHEN 325 MG/1
975 TABLET ORAL EVERY 8 HOURS
Status: DISCONTINUED | OUTPATIENT
Start: 2024-11-24 | End: 2024-11-27 | Stop reason: HOSPADM

## 2024-11-24 RX ADMIN — CLOPIDOGREL 75 MG: 75 TABLET ORAL at 19:00

## 2024-11-24 RX ADMIN — IOHEXOL 75 ML: 350 INJECTION, SOLUTION INTRAVENOUS at 15:15

## 2024-11-24 RX ADMIN — EZETIMIBE 10 MG: 10 TABLET ORAL at 19:00

## 2024-11-24 RX ADMIN — MORPHINE SULFATE 4 MG: 4 INJECTION INTRAVENOUS at 15:28

## 2024-11-24 RX ADMIN — ENOXAPARIN SODIUM 40 MG: 40 INJECTION SUBCUTANEOUS at 21:07

## 2024-11-24 RX ADMIN — PRAVASTATIN SODIUM 40 MG: 40 TABLET ORAL at 21:08

## 2024-11-24 RX ADMIN — LIDOCAINE HYDROCHLORIDE 1 APPLICATION: 20 JELLY TOPICAL at 15:43

## 2024-11-24 RX ADMIN — DONEPEZIL HYDROCHLORIDE 5 MG: 5 TABLET ORAL at 21:07

## 2024-11-24 RX ADMIN — ONDANSETRON 4 MG: 2 INJECTION, SOLUTION INTRAMUSCULAR; INTRAVENOUS at 14:37

## 2024-11-24 RX ADMIN — ONDANSETRON 4 MG: 2 INJECTION, SOLUTION INTRAMUSCULAR; INTRAVENOUS at 15:28

## 2024-11-24 RX ADMIN — METOPROLOL SUCCINATE 50 MG: 50 TABLET, EXTENDED RELEASE ORAL at 19:00

## 2024-11-24 RX ADMIN — ACETAMINOPHEN 975 MG: 325 TABLET ORAL at 19:00

## 2024-11-24 RX ADMIN — Medication 3 MG: at 21:08

## 2024-11-24 SDOH — SOCIAL STABILITY: SOCIAL INSECURITY
WITHIN THE LAST YEAR, HAVE YOU BEEN KICKED, HIT, SLAPPED, OR OTHERWISE PHYSICALLY HURT BY YOUR PARTNER OR EX-PARTNER?: NO

## 2024-11-24 SDOH — SOCIAL STABILITY: SOCIAL INSECURITY: WITHIN THE LAST YEAR, HAVE YOU BEEN HUMILIATED OR EMOTIONALLY ABUSED IN OTHER WAYS BY YOUR PARTNER OR EX-PARTNER?: NO

## 2024-11-24 SDOH — SOCIAL STABILITY: SOCIAL INSECURITY: ARE YOU OR HAVE YOU BEEN THREATENED OR ABUSED PHYSICALLY, EMOTIONALLY, OR SEXUALLY BY ANYONE?: NO

## 2024-11-24 SDOH — SOCIAL STABILITY: SOCIAL INSECURITY: ARE THERE ANY APPARENT SIGNS OF INJURIES/BEHAVIORS THAT COULD BE RELATED TO ABUSE/NEGLECT?: NO

## 2024-11-24 SDOH — SOCIAL STABILITY: SOCIAL INSECURITY: ABUSE: ADULT

## 2024-11-24 SDOH — SOCIAL STABILITY: SOCIAL INSECURITY
WITHIN THE LAST YEAR, HAVE YOU BEEN RAPED OR FORCED TO HAVE ANY KIND OF SEXUAL ACTIVITY BY YOUR PARTNER OR EX-PARTNER?: NO

## 2024-11-24 SDOH — ECONOMIC STABILITY: INCOME INSECURITY: IN THE PAST 12 MONTHS HAS THE ELECTRIC, GAS, OIL, OR WATER COMPANY THREATENED TO SHUT OFF SERVICES IN YOUR HOME?: NO

## 2024-11-24 SDOH — ECONOMIC STABILITY: FOOD INSECURITY: WITHIN THE PAST 12 MONTHS, YOU WORRIED THAT YOUR FOOD WOULD RUN OUT BEFORE YOU GOT THE MONEY TO BUY MORE.: NEVER TRUE

## 2024-11-24 SDOH — SOCIAL STABILITY: SOCIAL INSECURITY: DOES ANYONE TRY TO KEEP YOU FROM HAVING/CONTACTING OTHER FRIENDS OR DOING THINGS OUTSIDE YOUR HOME?: NO

## 2024-11-24 SDOH — ECONOMIC STABILITY: FOOD INSECURITY: WITHIN THE PAST 12 MONTHS, THE FOOD YOU BOUGHT JUST DIDN'T LAST AND YOU DIDN'T HAVE MONEY TO GET MORE.: NEVER TRUE

## 2024-11-24 SDOH — SOCIAL STABILITY: SOCIAL INSECURITY: WERE YOU ABLE TO COMPLETE ALL THE BEHAVIORAL HEALTH SCREENINGS?: YES

## 2024-11-24 SDOH — SOCIAL STABILITY: SOCIAL INSECURITY: DO YOU FEEL ANYONE HAS EXPLOITED OR TAKEN ADVANTAGE OF YOU FINANCIALLY OR OF YOUR PERSONAL PROPERTY?: NO

## 2024-11-24 SDOH — SOCIAL STABILITY: SOCIAL INSECURITY: WITHIN THE LAST YEAR, HAVE YOU BEEN AFRAID OF YOUR PARTNER OR EX-PARTNER?: NO

## 2024-11-24 SDOH — SOCIAL STABILITY: SOCIAL INSECURITY: DO YOU FEEL UNSAFE GOING BACK TO THE PLACE WHERE YOU ARE LIVING?: NO

## 2024-11-24 SDOH — SOCIAL STABILITY: SOCIAL INSECURITY: HAVE YOU HAD THOUGHTS OF HARMING ANYONE ELSE?: NO

## 2024-11-24 SDOH — SOCIAL STABILITY: SOCIAL INSECURITY: HAS ANYONE EVER THREATENED TO HURT YOUR FAMILY OR YOUR PETS?: NO

## 2024-11-24 SDOH — SOCIAL STABILITY: SOCIAL INSECURITY: HAVE YOU HAD ANY THOUGHTS OF HARMING ANYONE ELSE?: NO

## 2024-11-24 ASSESSMENT — ACTIVITIES OF DAILY LIVING (ADL)
DRESSING YOURSELF: INDEPENDENT
ASSISTIVE_DEVICE: NONE;CANE
WALKS IN HOME: INDEPENDENT
TOILETING: INDEPENDENT
GROOMING: INDEPENDENT
HEARING - RIGHT EAR: DIFFICULTY WITH NOISE
JUDGMENT_ADEQUATE_SAFELY_COMPLETE_DAILY_ACTIVITIES: NO
HEARING - LEFT EAR: DIFFICULTY WITH NOISE
LACK_OF_TRANSPORTATION: NO
ADEQUATE_TO_COMPLETE_ADL: YES
PATIENT'S MEMORY ADEQUATE TO SAFELY COMPLETE DAILY ACTIVITIES?: NO
BATHING: NEEDS ASSISTANCE
FEEDING YOURSELF: INDEPENDENT

## 2024-11-24 ASSESSMENT — PAIN SCALES - GENERAL
PAINLEVEL_OUTOF10: 7
PAINLEVEL_OUTOF10: 3
PAINLEVEL_OUTOF10: 7
PAINLEVEL_OUTOF10: 3
PAINLEVEL_OUTOF10: 7
PAINLEVEL_OUTOF10: 3

## 2024-11-24 ASSESSMENT — LIFESTYLE VARIABLES
HOW OFTEN DO YOU HAVE A DRINK CONTAINING ALCOHOL: NEVER
AUDIT-C TOTAL SCORE: 0
HOW MANY STANDARD DRINKS CONTAINING ALCOHOL DO YOU HAVE ON A TYPICAL DAY: PATIENT DOES NOT DRINK
EVER HAD A DRINK FIRST THING IN THE MORNING TO STEADY YOUR NERVES TO GET RID OF A HANGOVER: NO
HOW OFTEN DO YOU HAVE 6 OR MORE DRINKS ON ONE OCCASION: NEVER
AUDIT-C TOTAL SCORE: 0
TOTAL SCORE: 0
EVER FELT BAD OR GUILTY ABOUT YOUR DRINKING: NO
HAVE PEOPLE ANNOYED YOU BY CRITICIZING YOUR DRINKING: NO
HAVE YOU EVER FELT YOU SHOULD CUT DOWN ON YOUR DRINKING: NO
SKIP TO QUESTIONS 9-10: 1

## 2024-11-24 ASSESSMENT — COGNITIVE AND FUNCTIONAL STATUS - GENERAL
PATIENT BASELINE BEDBOUND: NO
DAILY ACTIVITIY SCORE: 24
MOBILITY SCORE: 24

## 2024-11-24 ASSESSMENT — ENCOUNTER SYMPTOMS
ABDOMINAL DISTENTION: 1
BACK PAIN: 1
DIFFICULTY URINATING: 1
HEMATURIA: 1
ABDOMINAL PAIN: 1

## 2024-11-24 ASSESSMENT — PAIN - FUNCTIONAL ASSESSMENT: PAIN_FUNCTIONAL_ASSESSMENT: 0-10

## 2024-11-24 ASSESSMENT — COLUMBIA-SUICIDE SEVERITY RATING SCALE - C-SSRS
1. IN THE PAST MONTH, HAVE YOU WISHED YOU WERE DEAD OR WISHED YOU COULD GO TO SLEEP AND NOT WAKE UP?: NO
2. HAVE YOU ACTUALLY HAD ANY THOUGHTS OF KILLING YOURSELF?: NO
6. HAVE YOU EVER DONE ANYTHING, STARTED TO DO ANYTHING, OR PREPARED TO DO ANYTHING TO END YOUR LIFE?: NO

## 2024-11-24 ASSESSMENT — PATIENT HEALTH QUESTIONNAIRE - PHQ9
1. LITTLE INTEREST OR PLEASURE IN DOING THINGS: NOT AT ALL
2. FEELING DOWN, DEPRESSED OR HOPELESS: NOT AT ALL
SUM OF ALL RESPONSES TO PHQ9 QUESTIONS 1 & 2: 0

## 2024-11-24 NOTE — ED PROVIDER NOTES
HPI   Chief Complaint   Patient presents with    multiple medical complaints     Abdominal pain, back pain, shoulder pain, chest pain. Denies nausea/vomiting/sob       History of present illness:  89-year-old male presents emergency room for complaints of multiple complaints.  The patient states he began having pain in his abdomen and his lower back and in his shoulders bilaterally couple days ago.  He denies any falls or trauma or injuries.  He states he is unsure what is causing this and states he has any nausea or vomiting or change in bowel habits.  He is companied by his son who provides more history stating that his father unfortunately does have a history of dementia and does live by himself.  He states that the patient is on Plavix and aspirin as well and has a history o coronary artery disease and had a triple bypass about 15 years ago he also has a history of CLL which is in remission as of about 15 years ago.  He states he also takes medications for acid reflux as well as pain medications for postherpetic neuralgia.  He states that he sees his father on a daily basis as he lives nearby and visits him to make sure that he is taking his meds and that he has the house cared for.  He states that when he went to visit him today his father was complaining with the symptoms and states he did not see anything about yesterday.  He states the patient has been ambulating otherwise and denies any other symptoms.  The patient denies any other symptoms at this time.    Social history: Negative for alcohol and drug use.    Review of systems:   Gen.: No weight loss, fatigue, anorexia, insomnia, fever.   Eyes: No vision loss, double vision, drainage, eye pain.   ENT: No pharyngitis, neck pain, headache  Cardiac: No  palpitations, near syncope, syncope  Pulmonary: No shortness of breath, cough, hemoptysis.   Heme/lymph: No swollen glands, fever, bleeding.   GI: No abdominal pain, change in bowel habits, melena,  hematemesis, hematochezia, nausea, vomiting, diarrhea.   : No discharge, dysuria, frequency, urgency, hematuria.   Musculoskeletal: No  joint swelling.   Skin: No rashes.   Review of systems is otherwise negative unless stated above or in history of present illness.        Physical exam:  General: Vitals noted, no distress. Afebrile.   EENT: No lymphadenopathy appreciated  Cardiac: Regular, rate, rhythm, no murmur.   Pulmonary: Lungs clear bilaterally with good aeration. No adventitious breath sounds.   Abdomen: Soft, nonsurgical. Pain to palpation over the suprapubic area  peritoneal signs. Normoactive bowel sounds.   Extremities: No peripheral edema.  No pain to palpation across the limbs, the patient is able to ambulate with difficulty  Skin: No rash.   Neuro: No focal neurologic deficits      Medical decision making:   Testing: CBC CMP troponin x 2 urinalysis INR: First troponin is 44-second troponin 47 CMP unremarkable CBC shows white count 17.9 INR 1.3 urinalysis shows greater than 20 red blood cells but no other acute findings in particular no infection, CT scan of head cervical spine thoracic spine lumbar spine chest abdomen pelvis shows unfortunately metastatic lesions consistent with prostate cancer throughout the bones in the ribs as well as in the spine and in the pelvis.  As interpreted by radiology      EKG taken on November 24, 2024 1249 shows normal sinus rhythm at 82, no T wave inversion no STEMI no ST depression    Treatment: IV morphine and IV Zofran given for comfort  Reevaluation:   Plan: 89-year-old male presents emergency room for complaints of multiple complaints.  The patient states he began having pain in his abdomen and his lower back and in his shoulders bilaterally couple days ago.  He denies any falls or trauma or injuries.  He states he is unsure what is causing this and states he has any nausea or vomiting or change in bowel habits.  He is companied by his son who provides more  history stating that his father unfortunately does have a history of dementia and does live by himself.  He states that the patient is on Plavix and aspirin as well and has a history o coronary artery disease and had a triple bypass about 15 years ago he also has a history of CLL which is in remission as of about 15 years ago.  He states he also takes medications for acid reflux as well as pain medications for postherpetic neuralgia.  He states that he sees his father on a daily basis as he lives nearby and visits him to make sure that he is taking his meds and that he has the house cared for.  He states that when he went to visit him today his father was complaining with the symptoms and states he did not see anything about yesterday.  He states the patient has been ambulating otherwise and denies any other symptoms.  The patient denies any other symptoms at this time.Cardiac: Regular, rate, rhythm, no murmur.   Pulmonary: Lungs clear bilaterally with good aeration. No adventitious breath sounds.   Abdomen: Soft, nonsurgical. No Patient complains of pain to palpation across the suprapubic area  peritoneal signs. Normoactive bowel sounds.   Extremities: No peripheral edema.  No pain to palpation across the limbs, the patient is able to ambulate with difficulty.  Bladder scan which was performed showed over 800 cc of urine present in the bladder and Narayanan catheter was easily placed and 800 cc of urine was easily removed I explained to the patient's son at bedside the test results at this time we discussed possibility of patient going home and following up in outpatient setting versus being admitted for pain control.  He requested that the patient be admitted at this time for further care and pain control the patient was agreeable this plan as well.  He explained to me they also want to reach out to family members as well to have a discussion with them about possible treatment plans.  I spoke with the hospitalist team  who is in agreement this plan the patient is admitted to their care at this time for pain management.     Impression:   1.  Urinary retention  2.  Stage IV prostate cancer          History provided by:  Patient   used: No            Patient History   Past Medical History:   Diagnosis Date    Anesthesia of skin 12/18/2014    Hand numbness    Arthritis     Body mass index (BMI) 34.0-34.9, adult 01/02/2020    Body mass index (BMI) of 34.0 to 34.9 in adult    Calculus of bile duct without cholangitis or cholecystitis without obstruction 07/09/2014    Gall stones, common bile duct    Cancer (Multi)     Cellulitis of unspecified part of limb 10/16/2020    Cellulitis of leg, except foot    Dementia     Encounter for immunization 01/02/2020    Influenza vaccine administered    Encounter for immunization 08/10/2021    Need for Tdap vaccination    Esophageal obstruction     Esophageal stricture    Nonscarring hair loss, unspecified 02/10/2016    Hair loss    Other conditions influencing health status 04/25/2019    History of cough    Personal history of diseases of the blood and blood-forming organs and certain disorders involving the immune mechanism 06/09/2022    History of iron deficiency anemia    Personal history of other diseases of the digestive system     History of esophageal reflux    Personal history of other diseases of the musculoskeletal system and connective tissue 05/31/2018    History of back pain    Personal history of other diseases of the nervous system and sense organs 03/13/2015    History of carpal tunnel syndrome    Personal history of other infectious and parasitic diseases 06/09/2022    History of herpes zoster    Polyp of colon 05/31/2017    Benign colonic polyp    Radiculopathy, lumbar region 12/18/2014    Lumbar radiculopathy    Rash and other nonspecific skin eruption 02/19/2022    Rash, skin    Tinnitus     Unspecified abdominal pain 06/26/2014    Abdominal pain of multiple  sites    Unspecified malignant neoplasm of skin of unspecified part of face 01/02/2020    Skin cancer of face     Past Surgical History:   Procedure Laterality Date    APPENDECTOMY  10/10/2013    Appendectomy    CARPAL TUNNEL RELEASE  05/05/2015    Neuroplasty Decompression Median Nerve At Carpal Tunnel    CHOLECYSTECTOMY  12/17/2014    Cholecystectomy Laparoscopic    COLONOSCOPY  06/24/2014    Complete Colonoscopy    CORONARY ARTERY BYPASS GRAFT  06/23/2014    CABG    GALLBLADDER SURGERY      OTHER SURGICAL HISTORY  06/02/2020    Upper Gastrointestinal Endoscopy, Simple Primary Exam    OTHER SURGICAL HISTORY  06/23/2014    Diagnostic Esophagoscopy Transoral Flexible With Biopsy    TONSILLECTOMY  10/10/2013    Tonsillectomy     Family History   Problem Relation Name Age of Onset    Dementia Mother      Colon cancer Brother      Alzheimer's disease Brother      Sarcoidosis Brother      Heart disease Son      Heart disease Other       Social History     Tobacco Use    Smoking status: Former     Types: Cigarettes    Smokeless tobacco: Never   Vaping Use    Vaping status: Never Used   Substance Use Topics    Alcohol use: Never    Drug use: Never       Physical Exam   ED Triage Vitals [11/24/24 1252]   Temp Heart Rate Resp BP   36.7 °C (98.1 °F) 80 18 168/69      SpO2 Temp Source Heart Rate Source Patient Position   97 % Skin Monitor Lying      BP Location FiO2 (%)     Right arm --       Physical Exam      ED Course & MDM   ED Course as of 11/24/24 1836   Sun Nov 24, 2024   1539 Patient had very marked discomfort when I palpate over his bladder and appears distended.  Bladder scan done by the nurse showed over 500 cc of urine.  Patient will be given a Uro-Jet and a catheter will be placed.  I suspect urinary tension is a part of his discomfort. [RZ]   1607 Over 700 cc of urine came out.  Patient appears more comfortable. 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

## 2024-11-24 NOTE — ED TRIAGE NOTES
Patient here for multiple medical complaints Abdominal pain, back pain, shoulder pain, chest pain. Denies nausea/vomiting/sob

## 2024-11-24 NOTE — PROGRESS NOTES
The patient was seen by the midlevel/resident.  I have personally saw the patient and made/approved the management plan and take responsibility for the patient management.  I reviewed the EKG's (when done) and agree with the interpretation.  I have seen and examined the patient; agree with the workup, evaluation, MDM, and diagnosis.  The care plan has been discussed with the midlevel/resident; I have reviewed the note and agree with the documented findings.       Patient reports marked discomfort over his groin up to his top of the abdomen.  Most of his discomfort appears to be lower.  He has a history of cancer in the past CLL which is in remission.  He is unable describe exact with going on but that is a constant discomfort low down.  Nurse reports has been trying to urinate here in the ED he has been unable.  We worked up with CT scans and labs.  Concern for possible prostate cancer with mets.  Patient was urinary retention.  Patient did well with the Narayanan putting out over 700 cc of urine.  Appears more comfortable.  Will be hospitalized and further worked up.  Talk to patient and son at bedside.  ED Course as of 11/24/24 1747   Sun Nov 24, 2024   1539 Patient had very marked discomfort when I palpate over his bladder and appears distended.  Bladder scan done by the nurse showed over 500 cc of urine.  Patient will be given a Uro-Jet and a catheter will be placed.  I suspect urinary tension is a part of his discomfort. [RZ]   1607 Over 700 cc of urine came out.  Patient appears more comfortable. 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

## 2024-11-24 NOTE — H&P
History Of Present Illness  Talib Siddiqui is a 89 y.o. male with history of basal cell carcinoma, CAD, chronic pain, CLL, DVT, HTN and dementia, who presented to the ED with complaints of severe pain of the back, groin and abdomen.  The patient is alert and oriented to self and unable to provide any reliable health history. His son reports that a couple days ago he started complaining of torso and abdominal pain.  Last night, the pain became worse and he was unable to sleep.  He presented to the ED when the pain worsened.  Nursing noted that the patient was unable to void and straight cathed for 700 cc of urine.  Pertinent blood work: WBC 17,900, AST 44 and troponin 44-47.  The EKG shows no ST elevation.  The CT abdomen shows extensive sclerotic metastatic lesions of the bones, primary possibly the prostate, mild hydronephrosis bilaterally, and multiple enlarged pelvic lymph nodes.  The patient has a history of CLL in remission.  Narayanan catheter inserted for urinary retention.  Urinalysis is positive for blood.  The patient is being admitted for oncology evaluation and recommendations, pain management and palliative care consult.    Past Medical History  Basal cell carcinoma  CAD  CLL  Dementia  DVT  Hypertension    Surgical History  Past Surgical History:   Procedure Laterality Date    APPENDECTOMY  10/10/2013    Appendectomy    CARPAL TUNNEL RELEASE  05/05/2015    Neuroplasty Decompression Median Nerve At Carpal Tunnel    CHOLECYSTECTOMY  12/17/2014    Cholecystectomy Laparoscopic    COLONOSCOPY  06/24/2014    Complete Colonoscopy    CORONARY ARTERY BYPASS GRAFT  06/23/2014    CABG    GALLBLADDER SURGERY      OTHER SURGICAL HISTORY  06/02/2020    Upper Gastrointestinal Endoscopy, Simple Primary Exam    OTHER SURGICAL HISTORY  06/23/2014    Diagnostic Esophagoscopy Transoral Flexible With Biopsy    TONSILLECTOMY  10/10/2013    Tonsillectomy        Social History  He reports that he has quit smoking. His smoking  "use included cigarettes. He has never used smokeless tobacco. He reports that he does not drink alcohol and does not use drugs.    Family History  Family History   Problem Relation Name Age of Onset    Dementia Mother      Colon cancer Brother      Alzheimer's disease Brother      Sarcoidosis Brother      Heart disease Son      Heart disease Other          Allergies  Tricyclic antidepressants and tricyclic compounds, Amitriptyline, Donepezil, Lisinopril, Penicillins, Sulfa (sulfonamide antibiotics), Meloxicam, Piperacillin-tazobactam, and Sulfamethoxazole    Review of Systems   Gastrointestinal:  Positive for abdominal distention and abdominal pain.   Genitourinary:  Positive for difficulty urinating and hematuria.   Musculoskeletal:  Positive for back pain.        Physical Exam  Eyes:      Extraocular Movements: Extraocular movements intact.   Cardiovascular:      Rate and Rhythm: Regular rhythm.   Abdominal:      Tenderness: There is generalized abdominal tenderness.   Neurological:      Mental Status: He is alert.      Comments: Alert to self only  Cognitive impairment          Last Recorded Vitals  Blood pressure 154/76, pulse 84, temperature 36.7 °C (98.1 °F), temperature source Skin, resp. rate 15, height 1.702 m (5' 7\"), weight 83.9 kg (185 lb), SpO2 (!) 93%.    Relevant Results      CBC and Auto Differential        Component Value Flag Ref Range Units Status    WBC 17.9      4.4 - 11.3 x10*3/uL Final    nRBC 0.0      0.0 - 0.0 /100 WBCs Final    RBC 4.05      4.50 - 5.90 x10*6/uL Final    Hemoglobin 12.4      13.5 - 17.5 g/dL Final    Hematocrit 37.9      41.0 - 52.0 % Final    MCV 94      80 - 100 fL Final    MCH 30.6      26.0 - 34.0 pg Final    MCHC 32.7      32.0 - 36.0 g/dL Final    RDW 13.6      11.5 - 14.5 % Final    Platelets 210      150 - 450 x10*3/uL Final    Neutrophils % 51.0      40.0 - 80.0 % Final    Immature Granulocytes %, Automated 0.3      0.0 - 0.9 % Final    Comment:    Immature " Granulocyte Count (IG) includes promyelocytes, myelocytes and metamyelocytes but does not include bands. Percent differential counts (%) should be interpreted in the context of the absolute cell counts (cells/UL).    Lymphocytes % 42.0      13.0 - 44.0 % Final    Monocytes % 6.0      2.0 - 10.0 % Final    Eosinophils % 0.3      0.0 - 6.0 % Final    Basophils % 0.4      0.0 - 2.0 % Final    Neutrophils Absolute 9.14      1.60 - 5.50 x10*3/uL Final    Comment:    Percent differential counts (%) should be interpreted in the context of the absolute cell counts (cells/uL).    Immature Granulocytes Absolute, Automated 0.06      0.00 - 0.50 x10*3/uL Final    Lymphocytes Absolute 7.53      0.80 - 3.00 x10*3/uL Final    Monocytes Absolute 1.07      0.05 - 0.80 x10*3/uL Final    Eosinophils Absolute 0.05      0.00 - 0.40 x10*3/uL Final    Basophils Absolute 0.08      0.00 - 0.10 x10*3/uL Final                  Comprehensive metabolic panel        Component Value Flag Ref Range Units Status    Glucose 95      74 - 99 mg/dL Final    Sodium 138      136 - 145 mmol/L Final    Potassium 4.1      3.5 - 5.3 mmol/L Final    Chloride 102      98 - 107 mmol/L Final    Bicarbonate 25      21 - 32 mmol/L Final    Anion Gap 15      10 - 20 mmol/L Final    Urea Nitrogen 17      6 - 23 mg/dL Final    Creatinine 0.96      0.50 - 1.30 mg/dL Final    eGFR 76      >60 mL/min/1.73m*2 Final    Comment:    Calculations of estimated GFR are performed using the 2021 CKD-EPI Study Refit equation without the race variable for the IDMS-Traceable creatinine methods.  https://jasn.asnjournals.org/content/early/2021/09/22/ASN.6567335222    Calcium 9.0      8.6 - 10.3 mg/dL Final    Albumin 4.0      3.4 - 5.0 g/dL Final    Alkaline Phosphatase 133      33 - 136 U/L Final    Total Protein 6.6      6.4 - 8.2 g/dL Final    AST 44      9 - 39 U/L Final    Bilirubin, Total 0.7      0.0 - 1.2 mg/dL Final    ALT 22      10 - 52 U/L Final    Comment:    Patients  treated with Sulfasalazine may generate falsely decreased results for ALT.                  Troponin I, High Sensitivity        Component Value Flag Ref Range Units Status    Troponin I, High Sensitivity 44      0 - 20 ng/L Final                  CT head wo IV contrast          Interpreted By:  Schoenberger, Joseph,   STUDY:  CT HEAD WO IV CONTRAST;  11/24/2024 3:12 pm      INDICATION:  Signs/Symptoms:headache.          COMPARISON:  None.      ACCESSION NUMBER(S):  [id number]      ORDERING CLINICIAN:  ALEKSANDER COLEMAN      TECHNIQUE:  Noncontrast axial CT scan of head was performed. Angled reformats in  brain and bone windows were generated. The images were reviewed in  bone, brain, blood and soft tissue windows.      FINDINGS:  CSF Spaces: Enlarged due to parenchymal volume loss. Normal  configuration with attack basal cisterns. There is no extraaxial  fluid collection.      Parenchyma:  The grey-white differentiation is intact. There is no  mass effect or midline shift.  There is no intracranial hemorrhage.      Calvarium: The calvarium is unremarkable.      Paranasal sinuses and mastoids: Visualized paranasal sinuses and  mastoids are clear.      IMPRESSION:  No evidence of acute cortical infarct or intracranial hemorrhage.      No evidence of intracranial hemorrhage or displaced skull fracture.      MACRO:  None      Signed by: Joseph Schoenberger 11/24/2024 3:24 PM  Dictation workstation:   [id number]         CT cervical spine wo IV contrast          Interpreted By:  Schoenberger, Joseph,   STUDY:  CT CERVICAL SPINE WO IV CONTRAST;  11/24/2024 3:12 pm      INDICATION:  Signs/Symptoms:neck pain.          COMPARISON:  None.      ACCESSION NUMBER(S):  [id number]      ORDERING CLINICIAN:  ALEKSANDER COLEMAN      TECHNIQUE:  Axial CT images of the cervical spine are obtained. Axial, coronal  and sagittal reconstructions are provided for review.      FINDINGS:  There are multiple indistinctly marginated sclerotic  lesions  involving both posterior elements and vertebral bodies in the  cervical and upper thoracic spine which are suggestive of blastic  metastatic lesions. In a male of this age this would typically be  associated with prostate cancer.      Fractures: There is no evidence for an acute fracture of the cervical  spine.      Vertebral Alignment: Within normal limits.      Craniocervical Junction: The odontoid process and craniocervical  junction are intact.      Vertebrae/Disc Spaces:  All cervical vertebra are maintained in  height without vertebral body fracture. There is multilevel moderate  degenerative disc narrowing and mild discogenic endplate spurring.  There is severe degenerative narrowing of the C5-C6 disc with some  discogenic endplate and uncinate spurring resulting in foraminal  encroachment.      Prevertebral/Paraspinal Soft Tissues: The prevertebral and paraspinal  soft tissues are unremarkable.      Posterior elements are aligned normally without fracture or  subluxation and relatively mild multilevel facet arthrosis.      IMPRESSION:  No acute fracture or subluxation.      Multifocal blastic metastatic disease the most likely explanation for  which is metastatic disease from prostate carcinoma.      MACRO:  None      Signed by: Joseph Schoenberger 11/24/2024 3:28 PM  Dictation workstation:   [id number]         CT chest abdomen pelvis w IV contrast          STUDY:  CT Chest, Abdomen, and Pelvis with IV Contrast, CT Thoracic Spine and  Lumbar Spine without IV Contrast; 11/24/2024 at 3:12 PM  INDICATION:  Mid back pain with radiation down to abdomen and into groin on left  side, persistent x2 days.   Additional History: Dementia.  COMPARISON:  CT abdomen and pelvis 10/12/16. Correlation with XR chest 04/29/21,  04/26/21, 04/25/19.  ACCESSION NUMBER(S):  [id number], [id number], [id number]  ORDERING CLINICIAN:  ALEKSANDER COLEMAN  TECHNIQUE:  CT of the chest, abdomen, and pelvis was performed.   Contiguous axial  images were obtained at 3 mm slice thickness through the chest,  abdomen, and pelvis.  Coronal and sagittal reconstructions at 3 mm  slice thickness were performed.  Omnipaque-350 75 mL was administered  intravenously.  Please note that spinal images were generated from the  original CT abdomen and pelvis imaging.   FINDINGS:  CHEST:  MEDIASTINUM:  Heart is enlarged.  Severe coronary artery calcifications.  Central  vascular structures opacify normally.    LUNGS/PLEURA:  There is no pleural effusion, pleural thickening, or pneumothorax.   The airways are patent.  Lungs are clear without consolidation, interstitial disease, or  suspicious nodules.  LYMPH NODES:  Thoracic lymph nodes are not enlarged.  ABDOMEN:     LIVER:  No hepatomegaly.  Smooth surface contour.  Mild fatty infiltration of  the liver.  Calcified hepatosplenic granulomata.     BILE DUCTS:  No intrahepatic or extrahepatic biliary ductal dilatation.  Mild  pneumobilia.     GALLBLADDER:  The gallbladder is absent.  STOMACH:  No abnormalities identified.     PANCREAS:  No masses or ductal dilatation.     SPLEEN:  No splenomegaly or focal splenic lesion.     ADRENAL GLANDS:  No thickening or nodules.     KIDNEYS AND URETERS:  Kidneys are normal in size and location.  No renal or ureteral  calculi.  Mild bilateral hydroureteronephrosis without a renal or  ureteral calculus extending down to the urinary bladder, nonspecific  in the setting of a distended urinary bladder     PELVIS:     BLADDER:  Urinary bladder is distended.  Anterior bladder diverticulum noted     REPRODUCTIVE ORGANS:  Prostate gland is top normal in size     BOWEL:  No abnormalities identified.     VESSELS:  No abnormalities identified.  Abdominal aorta is normal in caliber.   Moderate plaque along the distal aorta.     PERITONEUM/RETROPERITONEUM/LYMPH NODES:  No free fluid.  No pneumoperitoneum.  Multiple enlarged retroperitoneal lymph nodes measuring up to 1.9 cm  in  the left periaortic region.  Additional enlarged bilateral pelvic  lymph nodes measuring up to 2 cm along the left external iliac kierra  chain.     ABDOMINAL WALL:  No abnormalities identified.  SOFT TISSUES:   No abnormalities identified.     BONES:  Extensive scattered sclerotic osseous metastatic lesions throughout  the visualized bony structures.  No acute fracture.  THORACIC SPINE:  The alignment is anatomic.  There is no fracture or traumatic  subluxation.  Extensive sclerotic osseous metastatic disease involving  the thoracic spine.  The vertebral body heights are well maintained.  Moderate disc space  narrowing throughout the thoracic spine with accompanying anterior  marginal osteophytes.  No significant central canal stenosis is  demonstrated.  The neural foramina are patent throughout.    The paravertebral soft tissues are within normal limits.  LUMBAR SPINE:  The alignment is anatomic.  There is no fracture or traumatic  subluxation.  Extensive sclerotic osseous metastatic disease involving  the lumbar spine.  The vertebral body heights are well maintained.  Moderate disc space  narrowing at L5-S1 and mild disc space narrowing at L1-2 and L4-5.  No  significant central canal stenosis is demonstrated.  Moderate  bilateral neuroforaminal narrowing at L5-S1 and to a lesser extent at  L3-4 and L4-5.    The paravertebral soft tissues are within normal limits.   IMPRESSION:  1. No CT evidence for traumatic injury.  2. Extensive scattered sclerotic osseous metastatic lesions throughout  the visualized bony structures.  Primary consideration would BE  prostate carcinoma.  3. Multiple enlarged retroperitoneal lymph nodes measuring up to 1.9  cm in the left periaortic region. Additional enlarged bilateral pelvic  lymph nodes measuring up to 2 cm along the left external iliac kierra  chain. Findings compatible with metastatic disease.  4. Mild bilateral hydroureteronephrosis without a renal or ureteral  calculus  extending down to the urinary bladder, nonspecific in the  setting of a distended urinary bladder.  5. Cardiomegaly with severe coronary artery calcifications.  6. Mild hepatic steatosis.  Signed by Sean Allan MD         CT thoracic spine wo IV contrast          STUDY:  CT Chest, Abdomen, and Pelvis with IV Contrast, CT Thoracic Spine and  Lumbar Spine without IV Contrast; 11/24/2024 at 3:12 PM  INDICATION:  Mid back pain with radiation down to abdomen and into groin on left  side, persistent x2 days.   Additional History: Dementia.  COMPARISON:  CT abdomen and pelvis 10/12/16. Correlation with XR chest 04/29/21,  04/26/21, 04/25/19.  ACCESSION NUMBER(S):  [id number], [id number], [id number]  ORDERING CLINICIAN:  ALEKSANDER COLEMAN  TECHNIQUE:  CT of the chest, abdomen, and pelvis was performed.  Contiguous axial  images were obtained at 3 mm slice thickness through the chest,  abdomen, and pelvis.  Coronal and sagittal reconstructions at 3 mm  slice thickness were performed.  Omnipaque-350 75 mL was administered  intravenously.  Please note that spinal images were generated from the  original CT abdomen and pelvis imaging.   FINDINGS:  CHEST:  MEDIASTINUM:  Heart is enlarged.  Severe coronary artery calcifications.  Central  vascular structures opacify normally.    LUNGS/PLEURA:  There is no pleural effusion, pleural thickening, or pneumothorax.   The airways are patent.  Lungs are clear without consolidation, interstitial disease, or  suspicious nodules.  LYMPH NODES:  Thoracic lymph nodes are not enlarged.  ABDOMEN:     LIVER:  No hepatomegaly.  Smooth surface contour.  Mild fatty infiltration of  the liver.  Calcified hepatosplenic granulomata.     BILE DUCTS:  No intrahepatic or extrahepatic biliary ductal dilatation.  Mild  pneumobilia.     GALLBLADDER:  The gallbladder is absent.  STOMACH:  No abnormalities identified.     PANCREAS:  No masses or ductal dilatation.     SPLEEN:  No splenomegaly or focal  splenic lesion.     ADRENAL GLANDS:  No thickening or nodules.     KIDNEYS AND URETERS:  Kidneys are normal in size and location.  No renal or ureteral  calculi.  Mild bilateral hydroureteronephrosis without a renal or  ureteral calculus extending down to the urinary bladder, nonspecific  in the setting of a distended urinary bladder     PELVIS:     BLADDER:  Urinary bladder is distended.  Anterior bladder diverticulum noted     REPRODUCTIVE ORGANS:  Prostate gland is top normal in size     BOWEL:  No abnormalities identified.     VESSELS:  No abnormalities identified.  Abdominal aorta is normal in caliber.   Moderate plaque along the distal aorta.     PERITONEUM/RETROPERITONEUM/LYMPH NODES:  No free fluid.  No pneumoperitoneum.  Multiple enlarged retroperitoneal lymph nodes measuring up to 1.9 cm  in the left periaortic region.  Additional enlarged bilateral pelvic  lymph nodes measuring up to 2 cm along the left external iliac kierra  chain.     ABDOMINAL WALL:  No abnormalities identified.  SOFT TISSUES:   No abnormalities identified.     BONES:  Extensive scattered sclerotic osseous metastatic lesions throughout  the visualized bony structures.  No acute fracture.  THORACIC SPINE:  The alignment is anatomic.  There is no fracture or traumatic  subluxation.  Extensive sclerotic osseous metastatic disease involving  the thoracic spine.  The vertebral body heights are well maintained.  Moderate disc space  narrowing throughout the thoracic spine with accompanying anterior  marginal osteophytes.  No significant central canal stenosis is  demonstrated.  The neural foramina are patent throughout.    The paravertebral soft tissues are within normal limits.  LUMBAR SPINE:  The alignment is anatomic.  There is no fracture or traumatic  subluxation.  Extensive sclerotic osseous metastatic disease involving  the lumbar spine.  The vertebral body heights are well maintained.  Moderate disc space  narrowing at L5-S1 and mild  disc space narrowing at L1-2 and L4-5.  No  significant central canal stenosis is demonstrated.  Moderate  bilateral neuroforaminal narrowing at L5-S1 and to a lesser extent at  L3-4 and L4-5.    The paravertebral soft tissues are within normal limits.   IMPRESSION:  1. No CT evidence for traumatic injury.  2. Extensive scattered sclerotic osseous metastatic lesions throughout  the visualized bony structures.  Primary consideration would BE  prostate carcinoma.  3. Multiple enlarged retroperitoneal lymph nodes measuring up to 1.9  cm in the left periaortic region. Additional enlarged bilateral pelvic  lymph nodes measuring up to 2 cm along the left external iliac kierra  chain. Findings compatible with metastatic disease.  4. Mild bilateral hydroureteronephrosis without a renal or ureteral  calculus extending down to the urinary bladder, nonspecific in the  setting of a distended urinary bladder.  5. Cardiomegaly with severe coronary artery calcifications.  6. Mild hepatic steatosis.  Signed by Sean Allan MD         CT lumbar spine wo IV contrast          STUDY:  CT Chest, Abdomen, and Pelvis with IV Contrast, CT Thoracic Spine and  Lumbar Spine without IV Contrast; 11/24/2024 at 3:12 PM  INDICATION:  Mid back pain with radiation down to abdomen and into groin on left  side, persistent x2 days.   Additional History: Dementia.  COMPARISON:  CT abdomen and pelvis 10/12/16. Correlation with XR chest 04/29/21,  04/26/21, 04/25/19.  ACCESSION NUMBER(S):  [id number], [id number], [id number]  ORDERING CLINICIAN:  ALEKSANDER COLEMAN  TECHNIQUE:  CT of the chest, abdomen, and pelvis was performed.  Contiguous axial  images were obtained at 3 mm slice thickness through the chest,  abdomen, and pelvis.  Coronal and sagittal reconstructions at 3 mm  slice thickness were performed.  Omnipaque-350 75 mL was administered  intravenously.  Please note that spinal images were generated from the  original CT abdomen and pelvis  imaging.   FINDINGS:  CHEST:  MEDIASTINUM:  Heart is enlarged.  Severe coronary artery calcifications.  Central  vascular structures opacify normally.    LUNGS/PLEURA:  There is no pleural effusion, pleural thickening, or pneumothorax.   The airways are patent.  Lungs are clear without consolidation, interstitial disease, or  suspicious nodules.  LYMPH NODES:  Thoracic lymph nodes are not enlarged.  ABDOMEN:     LIVER:  No hepatomegaly.  Smooth surface contour.  Mild fatty infiltration of  the liver.  Calcified hepatosplenic granulomata.     BILE DUCTS:  No intrahepatic or extrahepatic biliary ductal dilatation.  Mild  pneumobilia.     GALLBLADDER:  The gallbladder is absent.  STOMACH:  No abnormalities identified.     PANCREAS:  No masses or ductal dilatation.     SPLEEN:  No splenomegaly or focal splenic lesion.     ADRENAL GLANDS:  No thickening or nodules.     KIDNEYS AND URETERS:  Kidneys are normal in size and location.  No renal or ureteral  calculi.  Mild bilateral hydroureteronephrosis without a renal or  ureteral calculus extending down to the urinary bladder, nonspecific  in the setting of a distended urinary bladder     PELVIS:     BLADDER:  Urinary bladder is distended.  Anterior bladder diverticulum noted     REPRODUCTIVE ORGANS:  Prostate gland is top normal in size     BOWEL:  No abnormalities identified.     VESSELS:  No abnormalities identified.  Abdominal aorta is normal in caliber.   Moderate plaque along the distal aorta.     PERITONEUM/RETROPERITONEUM/LYMPH NODES:  No free fluid.  No pneumoperitoneum.  Multiple enlarged retroperitoneal lymph nodes measuring up to 1.9 cm  in the left periaortic region.  Additional enlarged bilateral pelvic  lymph nodes measuring up to 2 cm along the left external iliac kierra  chain.     ABDOMINAL WALL:  No abnormalities identified.  SOFT TISSUES:   No abnormalities identified.     BONES:  Extensive scattered sclerotic osseous metastatic lesions throughout  the  visualized bony structures.  No acute fracture.  THORACIC SPINE:  The alignment is anatomic.  There is no fracture or traumatic  subluxation.  Extensive sclerotic osseous metastatic disease involving  the thoracic spine.  The vertebral body heights are well maintained.  Moderate disc space  narrowing throughout the thoracic spine with accompanying anterior  marginal osteophytes.  No significant central canal stenosis is  demonstrated.  The neural foramina are patent throughout.    The paravertebral soft tissues are within normal limits.  LUMBAR SPINE:  The alignment is anatomic.  There is no fracture or traumatic  subluxation.  Extensive sclerotic osseous metastatic disease involving  the lumbar spine.  The vertebral body heights are well maintained.  Moderate disc space  narrowing at L5-S1 and mild disc space narrowing at L1-2 and L4-5.  No  significant central canal stenosis is demonstrated.  Moderate  bilateral neuroforaminal narrowing at L5-S1 and to a lesser extent at  L3-4 and L4-5.    The paravertebral soft tissues are within normal limits.   IMPRESSION:  1. No CT evidence for traumatic injury.  2. Extensive scattered sclerotic osseous metastatic lesions throughout  the visualized bony structures.  Primary consideration would BE  prostate carcinoma.  3. Multiple enlarged retroperitoneal lymph nodes measuring up to 1.9  cm in the left periaortic region. Additional enlarged bilateral pelvic  lymph nodes measuring up to 2 cm along the left external iliac kierra  chain. Findings compatible with metastatic disease.  4. Mild bilateral hydroureteronephrosis without a renal or ureteral  calculus extending down to the urinary bladder, nonspecific in the  setting of a distended urinary bladder.  5. Cardiomegaly with severe coronary artery calcifications.  6. Mild hepatic steatosis.  Signed by Sean Allan MD         Troponin I, High Sensitivity        Component Value Flag Ref Range Units Status    Troponin I, High  Sensitivity 47      0 - 20 ng/L Final                  Protime-INR        Component Value Flag Ref Range Units Status    Protime 14.6      9.8 - 12.8 seconds Final    INR 1.3      0.9 - 1.1  Final                  Urinalysis with Reflex Culture and Microscopic        Component Value Flag Ref Range Units Status    Color, Urine Yellow      Light-Yellow, Yellow, Dark-Yellow  Final    Appearance, Urine Clear      Clear  Final    Specific Gravity, Urine 1.027      1.005 - 1.035  Final    pH, Urine 5.5      5.0, 5.5, 6.0, 6.5, 7.0, 7.5, 8.0  Final    Protein, Urine 100 (2+)      NEGATIVE, 10 (TRACE), 20 (TRACE) mg/dL Final    Glucose, Urine Normal      Normal mg/dL Final    Blood, Urine 0.1 (1+)      NEGATIVE  Final    Ketones, Urine NEGATIVE      NEGATIVE mg/dL Final    Bilirubin, Urine NEGATIVE      NEGATIVE  Final    Urobilinogen, Urine Normal      Normal mg/dL Final    Nitrite, Urine NEGATIVE      NEGATIVE  Final    Leukocyte Esterase, Urine NEGATIVE      NEGATIVE  Final                  Urinalysis Microscopic        Component Value Flag Ref Range Units Status    WBC, Urine 1-5      1-5, NONE /HPF Final    RBC, Urine >20      NONE, 1-2, 3-5 /HPF Final    Mucus, Urine FEW      Reference range not established. /LPF Final                          Assessment/Plan     Talib Siddiqui is a 89 y.o. male with history of basal cell carcinoma, CAD, chronic pain, CLL, DVT, HTN and dementia, who presented to the ED with complaints of severe pain of the back, groin and abdomen.      Newly diagnosed metastatic disease  Intractable pain  -CT abdomen shows extensive sclerotic metastatic lesions of the bones, primary possibly the prostate, mild hydronephrosis bilaterally, and multiple enlarged pelvic lymph nodes.  -Pain management with oxycodone and morphine for breakthrough pain  -Scheduled Tylenol  -Lidocaine patches for back pain  -Oncology consult to discuss treatment options  -PT/ OT eval  -Palliative care to discuss goals of  care/possible hospice referral. Son would like to speak with palliative. Please call when possible.     Mild bilateral hydronephrosis  Urinary retention  Microscopic hematuria  -Prostate possibly primary  -Narayanan catheter  -Monitor intake and output  -Consider urology consult     Leukocytosis  -Unsure of source, possibly due to malignancy  -Trend CBC    Elevated troponin ACS unlikely  -EKG shows no ST elevation  -Telemetry monitoring  -Repeat EKG in the morning    DVT prophylaxis/ DVT hx  -SCDs  -There is no fill history for the Apixaban will verify  -Lovenox        CLARISSE Sow-CNP

## 2024-11-25 LAB
ANION GAP SERPL CALC-SCNC: 14 MMOL/L (ref 10–20)
BUN SERPL-MCNC: 17 MG/DL (ref 6–23)
CALCIUM SERPL-MCNC: 8.1 MG/DL (ref 8.6–10.3)
CHLORIDE SERPL-SCNC: 104 MMOL/L (ref 98–107)
CO2 SERPL-SCNC: 28 MMOL/L (ref 21–32)
CREAT SERPL-MCNC: 1.01 MG/DL (ref 0.5–1.3)
EGFRCR SERPLBLD CKD-EPI 2021: 72 ML/MIN/1.73M*2
ERYTHROCYTE [DISTWIDTH] IN BLOOD BY AUTOMATED COUNT: 13.6 % (ref 11.5–14.5)
GLUCOSE SERPL-MCNC: 93 MG/DL (ref 74–99)
HCT VFR BLD AUTO: 34.6 % (ref 41–52)
HGB BLD-MCNC: 10.9 G/DL (ref 13.5–17.5)
HOLD SPECIMEN: NORMAL
MCH RBC QN AUTO: 29.9 PG (ref 26–34)
MCHC RBC AUTO-ENTMCNC: 31.5 G/DL (ref 32–36)
MCV RBC AUTO: 95 FL (ref 80–100)
NRBC BLD-RTO: 0 /100 WBCS (ref 0–0)
PLATELET # BLD AUTO: 186 X10*3/UL (ref 150–450)
POTASSIUM SERPL-SCNC: 4 MMOL/L (ref 3.5–5.3)
RBC # BLD AUTO: 3.64 X10*6/UL (ref 4.5–5.9)
SODIUM SERPL-SCNC: 142 MMOL/L (ref 136–145)
WBC # BLD AUTO: 13.5 X10*3/UL (ref 4.4–11.3)

## 2024-11-25 PROCEDURE — 2500000004 HC RX 250 GENERAL PHARMACY W/ HCPCS (ALT 636 FOR OP/ED): Performed by: NURSE PRACTITIONER

## 2024-11-25 PROCEDURE — 80048 BASIC METABOLIC PNL TOTAL CA: CPT | Performed by: NURSE PRACTITIONER

## 2024-11-25 PROCEDURE — 2500000005 HC RX 250 GENERAL PHARMACY W/O HCPCS: Performed by: NURSE PRACTITIONER

## 2024-11-25 PROCEDURE — 36415 COLL VENOUS BLD VENIPUNCTURE: CPT | Performed by: NURSE PRACTITIONER

## 2024-11-25 PROCEDURE — 1200000002 HC GENERAL ROOM WITH TELEMETRY DAILY

## 2024-11-25 PROCEDURE — 2500000002 HC RX 250 W HCPCS SELF ADMINISTERED DRUGS (ALT 637 FOR MEDICARE OP, ALT 636 FOR OP/ED): Performed by: NURSE PRACTITIONER

## 2024-11-25 PROCEDURE — 2500000001 HC RX 250 WO HCPCS SELF ADMINISTERED DRUGS (ALT 637 FOR MEDICARE OP): Performed by: NURSE PRACTITIONER

## 2024-11-25 PROCEDURE — 85027 COMPLETE CBC AUTOMATED: CPT | Performed by: NURSE PRACTITIONER

## 2024-11-25 PROCEDURE — 99497 ADVNCD CARE PLAN 30 MIN: CPT | Performed by: NURSE PRACTITIONER

## 2024-11-25 RX ORDER — LIDOCAINE 560 MG/1
1 PATCH PERCUTANEOUS; TOPICAL; TRANSDERMAL DAILY
Status: DISCONTINUED | OUTPATIENT
Start: 2024-11-25 | End: 2024-11-27 | Stop reason: HOSPADM

## 2024-11-25 RX ADMIN — ACETAMINOPHEN 975 MG: 325 TABLET ORAL at 20:47

## 2024-11-25 RX ADMIN — ACETAMINOPHEN 975 MG: 325 TABLET ORAL at 09:56

## 2024-11-25 RX ADMIN — PRAVASTATIN SODIUM 40 MG: 40 TABLET ORAL at 20:48

## 2024-11-25 RX ADMIN — Medication 3 MG: at 20:48

## 2024-11-25 RX ADMIN — ENOXAPARIN SODIUM 40 MG: 40 INJECTION SUBCUTANEOUS at 20:48

## 2024-11-25 RX ADMIN — CLOPIDOGREL 75 MG: 75 TABLET ORAL at 09:46

## 2024-11-25 RX ADMIN — EZETIMIBE 10 MG: 10 TABLET ORAL at 09:45

## 2024-11-25 RX ADMIN — DONEPEZIL HYDROCHLORIDE 5 MG: 5 TABLET ORAL at 20:47

## 2024-11-25 RX ADMIN — PANTOPRAZOLE SODIUM 40 MG: 40 TABLET, DELAYED RELEASE ORAL at 05:41

## 2024-11-25 ASSESSMENT — COGNITIVE AND FUNCTIONAL STATUS - GENERAL
DRESSING REGULAR UPPER BODY CLOTHING: A LITTLE
DRESSING REGULAR LOWER BODY CLOTHING: A LITTLE
MOBILITY SCORE: 21
TOILETING: A LITTLE
DRESSING REGULAR LOWER BODY CLOTHING: A LITTLE
WALKING IN HOSPITAL ROOM: A LITTLE
DRESSING REGULAR UPPER BODY CLOTHING: A LITTLE
TOILETING: A LITTLE
CLIMB 3 TO 5 STEPS WITH RAILING: A LOT
TOILETING: A LITTLE
MOBILITY SCORE: 21
HELP NEEDED FOR BATHING: A LITTLE
DAILY ACTIVITIY SCORE: 20
DAILY ACTIVITIY SCORE: 20
WALKING IN HOSPITAL ROOM: A LITTLE
DRESSING REGULAR LOWER BODY CLOTHING: A LITTLE
WALKING IN HOSPITAL ROOM: A LITTLE
DAILY ACTIVITIY SCORE: 20
HELP NEEDED FOR BATHING: A LITTLE
CLIMB 3 TO 5 STEPS WITH RAILING: A LOT
HELP NEEDED FOR BATHING: A LITTLE
MOBILITY SCORE: 21
CLIMB 3 TO 5 STEPS WITH RAILING: A LOT
DRESSING REGULAR UPPER BODY CLOTHING: A LITTLE

## 2024-11-25 ASSESSMENT — PAIN SCALES - GENERAL
PAINLEVEL_OUTOF10: 0 - NO PAIN

## 2024-11-25 ASSESSMENT — PAIN - FUNCTIONAL ASSESSMENT: PAIN_FUNCTIONAL_ASSESSMENT: 0-10

## 2024-11-25 ASSESSMENT — ACTIVITIES OF DAILY LIVING (ADL): LACK_OF_TRANSPORTATION: NO

## 2024-11-25 NOTE — CARE PLAN
The patient's goals for the shift include  use call light for assistance     The clinical goals for the shift include patient will remain safe in bed throughout shift

## 2024-11-25 NOTE — CONSULTS
PALLIATIVE MEDICINE CONSULT   Attending Physician: Gloria Kinsey DO  Reason For Consult: Assistance with determining goals of care, complex medical decision making, code status discussion, and symptom management if appropriate.     INTRODUCTIONS   Patient's capacity: Partial and they are able to provide meaningful input.  Family present:  Son Ross and pt daughter in law Krishna   Service: Palliative care was introduced as a resource for patients with serious illness to help with symptoms, assist with goals of care conversations, navigate complex decision making, improve quality of life for patients, and provide support to patients and families.     SUBJECTIVE   History of the Present Illness  Talib Siddiqui is a 89 y.o. male who has a PMH of basal cell carcinoma, CAD, chronic pain, CLL, DVT, HTN and dementia. He presented to the ED with  a chief complaint of severe pain of the groin. He is oriented x 1-2 and history is limited by this. Per family he also complained of severe back pain as well. Symptoms started 2 days prior to coming in and was interfering with his sleep. He is receiving 975mg QID Tylenol and Lidocaine patch. He has not required any PRN doses of medication. He states that he feels fine and hopes to go home today.    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     ED course:   He was treated with morphine, zofran  Urine retention was addressed with a straight cath and 700ml was drained.   The labs are remarkable for WBC 17.9, slight anemia 12.4/37.9, trops flat in the 40's.  The vital signs have remained stable.     Palliative Medicine was consulted to determine goals of care, assist with complex medical decision making, and symptom management if appropriate.     The history/collateral  history was obtained from the  son  and record review due to pt being a poor historian.    Past Medical History  Anesthesia of skin (12/18/2014), Arthritis, Body mass index (BMI) 34.0-34.9, adult (01/02/2020), Calculus of bile duct without cholangitis or cholecystitis without obstruction (07/09/2014), Cancer (Multi), Cellulitis of unspecified part of limb (10/16/2020), Dementia, Encounter for immunization (01/02/2020), Encounter for immunization (08/10/2021), Esophageal obstruction, Nonscarring hair loss, unspecified (02/10/2016), Other conditions influencing health status (04/25/2019), Personal history of diseases of the blood and blood-forming organs and certain disorders involving the immune mechanism (06/09/2022), Personal history of other diseases of the digestive system, Personal history of other diseases of the musculoskeletal system and connective tissue (05/31/2018), Personal history of other diseases of the nervous system and sense organs (03/13/2015), Personal history of other infectious and parasitic diseases (06/09/2022), Polyp of colon (05/31/2017), Radiculopathy, lumbar region (12/18/2014), Rash and other nonspecific skin eruption (02/19/2022), Tinnitus, Unspecified abdominal pain (06/26/2014), and Unspecified malignant neoplasm of skin of unspecified part of face (01/02/2020).    Past Surgical History   has a past surgical history that includes Tonsillectomy (10/10/2013); Appendectomy (10/10/2013); Carpal tunnel release (05/05/2015); Cholecystectomy (12/17/2014); Coronary artery bypass graft (06/23/2014); Colonoscopy (06/24/2014); Other surgical history (06/02/2020); Other surgical history (06/23/2014); and Gallbladder surgery.    Family Medical History  Family History   Problem Relation Name Age of Onset    Dementia Mother      Colon cancer Brother      Alzheimer's disease Brother      Sarcoidosis Brother      Heart disease Son      Heart disease Other         Home  Medications  Reviewed    Allergies  Allergies   Allergen Reactions    Tricyclic Antidepressants And Tricyclic Compounds Anaphylaxis    Amitriptyline Hallucinations    Donepezil Diarrhea    Lisinopril Other     cough    Penicillins Hives    Sulfa (Sulfonamide Antibiotics) Unknown    Meloxicam Rash    Piperacillin-Tazobactam Hives and Rash    Sulfamethoxazole Rash and Swelling       Social History  The patient is . The pt has  3 kids. He  reports that he has quit smoking. His smoking use included cigarettes. He has never used smokeless tobacco. He reports that he does not drink alcohol and does not use drugs..      Adventist and Spirituality:  Orthodox    ADVANCE CARE PLANNING AND COUNSELING   Voluntary Advance Care Planning  conversation took place with: Patient and son     Counseling and Support Provided  Counseling provided on clinical condition, including diagnoses   metastatic cancer    , dementia with decline in over time, poor prognosis associated with metastatic cancer longer term, and care plan options.  Reviewed advanced directive concepts  Education on hospice services provided   Resuscitation options with respect to QOL, disease stage, and expected outcomes.  Reviewed pt being unsafe to live alone anymore.  Patient/family verbalized understanding of this information.    All questions were answered to their satisfaction.  Support and empathy was provided throughout the conversation.    Serious Illness Perception  Serious Illness: Cancer that has spread to multiple parts of the body     Impression of disease and prognosis: will get worse over time     Concerns: managing urine retention at home with a catheter if the pt still needs it.     Hopes: To hear from oncology and from the medical team about getting rid of the catheter if possible     Minimal acceptable quality of Life: Able to be independent with most ADL's, able to walk / transfer with some help, the ability to recognize and clearly verbally  "communicate with others in a meaningful way No frequent physical suffering from symptoms    Maximum health burden for the possibility of more time: Unsure. Ricardo to think about this.     Health preferences and priorities for meghan EOL: comfort. Family wants to honor the living will.     Rating of family knowledge about pt's disease, priorities and wishes: Good    Resuscitation Assessment   They find resuscitation non beneficial because of the poor expected outcomes re survival and quality of life    Advanced Directive Documents/ Proxy  Son Ross is POA     Emergency Contact Information  Extended Emergency Contact Information  Primary Emergency Contact: Ross Siddiqui  Mobile Phone: [phone]  Relation: Son      OBJECTIVE   Inpatient Medications  acetaminophen, 975 mg, oral, q8h  [Held by provider] apixaban, 2.5 mg, oral, BID  clopidogrel, 75 mg, oral, Daily  donepezil, 5 mg, oral, Nightly  enoxaparin, 40 mg, subcutaneous, q24h  ezetimibe, 10 mg, oral, Daily  influenza, 0.5 mL, intramuscular, During hospitalization  lidocaine, 1 patch, transdermal, Daily  [Held by provider] losartan, 50 mg, oral, Daily  melatonin, 3 mg, oral, Nightly  metoprolol succinate XL, 50 mg, oral, Daily  pantoprazole, 40 mg, oral, Daily before breakfast  polyethylene glycol, 17 g, oral, Daily  pravastatin, 40 mg, oral, Nightly      Continuous medications     PRN medications  PRN medications: HYDROmorphone, nitroglycerin, ondansetron, oxyCODONE, oxyCODONE     Last Recorded Vitals  Blood pressure (!) 102/43, pulse 70, temperature 36.6 °C (97.9 °F), resp. rate 16, height 1.702 m (5' 7.01\"), weight 78.3 kg (172 lb 9.9 oz), SpO2 94%.  7 Day Weight Change: Unable to Calculate   Body mass index is 27.03 kg/m².   Weight change:      Relevant Results Reviewed   Lab Results   Component Value Date    WBC 13.5 (H) 11/25/2024    HGB 10.9 (L) 11/25/2024    HCT 34.6 (L) 11/25/2024    MCV 95 11/25/2024     11/25/2024      Lab Results   Component Value " Date    GLUCOSE 93 11/25/2024    CALCIUM 8.1 (L) 11/25/2024     11/25/2024    K 4.0 11/25/2024    CO2 28 11/25/2024     11/25/2024    BUN 17 11/25/2024    CREATININE 1.01 11/25/2024      Lab Results   Component Value Date    ALT 22 11/24/2024    AST 44 (H) 11/24/2024    ALKPHOS 133 11/24/2024    BILITOT 0.7 11/24/2024      Lab Results   Component Value Date    ALBUMIN 4.0 11/24/2024      Serum creatinine: 1.01 mg/dL 11/25/24 0626  Estimated creatinine clearance: 47.3 mL/min     Relevant Imaging Reviewed  ECG 12 lead    Result Date: 11/25/2024  Normal sinus rhythm Nonspecific ST abnormality Abnormal ECG When compared with ECG of 28-JUN-2021 13:43, Vent. rate has increased BY  27 BPM    CT chest abdomen pelvis w IV contrast    Result Date: 11/24/2024  STUDY: CT Chest, Abdomen, and Pelvis with IV Contrast, CT Thoracic Spine and Lumbar Spine without IV Contrast; 11/24/2024 at 3:12 PM INDICATION: Mid back pain with radiation down to abdomen and into groin on left side, persistent x2 days. Additional History: Dementia. COMPARISON: CT abdomen and pelvis 10/12/16. Correlation with XR chest 04/29/21, 04/26/21, 04/25/19. ACCESSION NUMBER(S): [id number], [id number], [id number] ORDERING CLINICIAN: ALEKSANDER COLEMAN TECHNIQUE: CT of the chest, abdomen, and pelvis was performed.  Contiguous axial images were obtained at 3 mm slice thickness through the chest, abdomen, and pelvis.  Coronal and sagittal reconstructions at 3 mm slice thickness were performed.  Omnipaque-350 75 mL was administered intravenously.  Please note that spinal images were generated from the original CT abdomen and pelvis imaging. FINDINGS: CHEST: MEDIASTINUM: Heart is enlarged.  Severe coronary artery calcifications.  Central vascular structures opacify normally.  LUNGS/PLEURA: There is no pleural effusion, pleural thickening, or pneumothorax. The airways are patent. Lungs are clear without consolidation, interstitial disease, or suspicious  nodules. LYMPH NODES: Thoracic lymph nodes are not enlarged. ABDOMEN:  LIVER: No hepatomegaly.  Smooth surface contour.  Mild fatty infiltration of the liver.  Calcified hepatosplenic granulomata.  BILE DUCTS: No intrahepatic or extrahepatic biliary ductal dilatation.  Mild pneumobilia.  GALLBLADDER: The gallbladder is absent. STOMACH: No abnormalities identified.  PANCREAS: No masses or ductal dilatation.  SPLEEN: No splenomegaly or focal splenic lesion.  ADRENAL GLANDS: No thickening or nodules.  KIDNEYS AND URETERS: Kidneys are normal in size and location.  No renal or ureteral calculi.  Mild bilateral hydroureteronephrosis without a renal or ureteral calculus extending down to the urinary bladder, nonspecific in the setting of a distended urinary bladder  PELVIS:  BLADDER: Urinary bladder is distended.  Anterior bladder diverticulum noted  REPRODUCTIVE ORGANS: Prostate gland is top normal in size  BOWEL: No abnormalities identified.  VESSELS: No abnormalities identified.  Abdominal aorta is normal in caliber. Moderate plaque along the distal aorta.  PERITONEUM/RETROPERITONEUM/LYMPH NODES: No free fluid.  No pneumoperitoneum. Multiple enlarged retroperitoneal lymph nodes measuring up to 1.9 cm in the left periaortic region.  Additional enlarged bilateral pelvic lymph nodes measuring up to 2 cm along the left external iliac kierra chain.  ABDOMINAL WALL: No abnormalities identified. SOFT TISSUES: No abnormalities identified.  BONES: Extensive scattered sclerotic osseous metastatic lesions throughout the visualized bony structures.  No acute fracture. THORACIC SPINE: The alignment is anatomic.  There is no fracture or traumatic subluxation.  Extensive sclerotic osseous metastatic disease involving the thoracic spine. The vertebral body heights are well maintained.  Moderate disc space narrowing throughout the thoracic spine with accompanying anterior marginal osteophytes.  No significant central canal stenosis is  demonstrated.  The neural foramina are patent throughout.  The paravertebral soft tissues are within normal limits. LUMBAR SPINE: The alignment is anatomic.  There is no fracture or traumatic subluxation.  Extensive sclerotic osseous metastatic disease involving the lumbar spine. The vertebral body heights are well maintained.  Moderate disc space narrowing at L5-S1 and mild disc space narrowing at L1-2 and L4-5.  No significant central canal stenosis is demonstrated.  Moderate bilateral neuroforaminal narrowing at L5-S1 and to a lesser extent at L3-4 and L4-5.  The paravertebral soft tissues are within normal limits.     1. No CT evidence for traumatic injury. 2. Extensive scattered sclerotic osseous metastatic lesions throughout the visualized bony structures.  Primary consideration would BE prostate carcinoma. 3. Multiple enlarged retroperitoneal lymph nodes measuring up to 1.9 cm in the left periaortic region. Additional enlarged bilateral pelvic lymph nodes measuring up to 2 cm along the left external iliac kierra chain. Findings compatible with metastatic disease. 4. Mild bilateral hydroureteronephrosis without a renal or ureteral calculus extending down to the urinary bladder, nonspecific in the setting of a distended urinary bladder. 5. Cardiomegaly with severe coronary artery calcifications. 6. Mild hepatic steatosis. Signed by Sean Allan MD    CT thoracic spine wo IV contrast    Result Date: 11/24/2024  STUDY: CT Chest, Abdomen, and Pelvis with IV Contrast, CT Thoracic Spine and Lumbar Spine without IV Contrast; 11/24/2024 at 3:12 PM INDICATION: Mid back pain with radiation down to abdomen and into groin on left side, persistent x2 days. Additional History: Dementia. COMPARISON: CT abdomen and pelvis 10/12/16. Correlation with XR chest 04/29/21, 04/26/21, 04/25/19. ACCESSION NUMBER(S): [id number], [id number], [id number] ORDERING CLINICIAN: ALEKSANDER COLEMAN TECHNIQUE: CT of the chest, abdomen, and  pelvis was performed.  Contiguous axial images were obtained at 3 mm slice thickness through the chest, abdomen, and pelvis.  Coronal and sagittal reconstructions at 3 mm slice thickness were performed.  Omnipaque-350 75 mL was administered intravenously.  Please note that spinal images were generated from the original CT abdomen and pelvis imaging. FINDINGS: CHEST: MEDIASTINUM: Heart is enlarged.  Severe coronary artery calcifications.  Central vascular structures opacify normally.  LUNGS/PLEURA: There is no pleural effusion, pleural thickening, or pneumothorax. The airways are patent. Lungs are clear without consolidation, interstitial disease, or suspicious nodules. LYMPH NODES: Thoracic lymph nodes are not enlarged. ABDOMEN:  LIVER: No hepatomegaly.  Smooth surface contour.  Mild fatty infiltration of the liver.  Calcified hepatosplenic granulomata.  BILE DUCTS: No intrahepatic or extrahepatic biliary ductal dilatation.  Mild pneumobilia.  GALLBLADDER: The gallbladder is absent. STOMACH: No abnormalities identified.  PANCREAS: No masses or ductal dilatation.  SPLEEN: No splenomegaly or focal splenic lesion.  ADRENAL GLANDS: No thickening or nodules.  KIDNEYS AND URETERS: Kidneys are normal in size and location.  No renal or ureteral calculi.  Mild bilateral hydroureteronephrosis without a renal or ureteral calculus extending down to the urinary bladder, nonspecific in the setting of a distended urinary bladder  PELVIS:  BLADDER: Urinary bladder is distended.  Anterior bladder diverticulum noted  REPRODUCTIVE ORGANS: Prostate gland is top normal in size  BOWEL: No abnormalities identified.  VESSELS: No abnormalities identified.  Abdominal aorta is normal in caliber. Moderate plaque along the distal aorta.  PERITONEUM/RETROPERITONEUM/LYMPH NODES: No free fluid.  No pneumoperitoneum. Multiple enlarged retroperitoneal lymph nodes measuring up to 1.9 cm in the left periaortic region.  Additional enlarged bilateral  pelvic lymph nodes measuring up to 2 cm along the left external iliac kierra chain.  ABDOMINAL WALL: No abnormalities identified. SOFT TISSUES: No abnormalities identified.  BONES: Extensive scattered sclerotic osseous metastatic lesions throughout the visualized bony structures.  No acute fracture. THORACIC SPINE: The alignment is anatomic.  There is no fracture or traumatic subluxation.  Extensive sclerotic osseous metastatic disease involving the thoracic spine. The vertebral body heights are well maintained.  Moderate disc space narrowing throughout the thoracic spine with accompanying anterior marginal osteophytes.  No significant central canal stenosis is demonstrated.  The neural foramina are patent throughout.  The paravertebral soft tissues are within normal limits. LUMBAR SPINE: The alignment is anatomic.  There is no fracture or traumatic subluxation.  Extensive sclerotic osseous metastatic disease involving the lumbar spine. The vertebral body heights are well maintained.  Moderate disc space narrowing at L5-S1 and mild disc space narrowing at L1-2 and L4-5.  No significant central canal stenosis is demonstrated.  Moderate bilateral neuroforaminal narrowing at L5-S1 and to a lesser extent at L3-4 and L4-5.  The paravertebral soft tissues are within normal limits.     1. No CT evidence for traumatic injury. 2. Extensive scattered sclerotic osseous metastatic lesions throughout the visualized bony structures.  Primary consideration would BE prostate carcinoma. 3. Multiple enlarged retroperitoneal lymph nodes measuring up to 1.9 cm in the left periaortic region. Additional enlarged bilateral pelvic lymph nodes measuring up to 2 cm along the left external iliac kierra chain. Findings compatible with metastatic disease. 4. Mild bilateral hydroureteronephrosis without a renal or ureteral calculus extending down to the urinary bladder, nonspecific in the setting of a distended urinary bladder. 5. Cardiomegaly  with severe coronary artery calcifications. 6. Mild hepatic steatosis. Signed by Sean Allan MD    CT lumbar spine wo IV contrast    Result Date: 11/24/2024  STUDY: CT Chest, Abdomen, and Pelvis with IV Contrast, CT Thoracic Spine and Lumbar Spine without IV Contrast; 11/24/2024 at 3:12 PM INDICATION: Mid back pain with radiation down to abdomen and into groin on left side, persistent x2 days. Additional History: Dementia. COMPARISON: CT abdomen and pelvis 10/12/16. Correlation with XR chest 04/29/21, 04/26/21, 04/25/19. ACCESSION NUMBER(S): [id number], [id number], [id number] ORDERING CLINICIAN: ALEKSANDER COLEMAN TECHNIQUE: CT of the chest, abdomen, and pelvis was performed.  Contiguous axial images were obtained at 3 mm slice thickness through the chest, abdomen, and pelvis.  Coronal and sagittal reconstructions at 3 mm slice thickness were performed.  Omnipaque-350 75 mL was administered intravenously.  Please note that spinal images were generated from the original CT abdomen and pelvis imaging. FINDINGS: CHEST: MEDIASTINUM: Heart is enlarged.  Severe coronary artery calcifications.  Central vascular structures opacify normally.  LUNGS/PLEURA: There is no pleural effusion, pleural thickening, or pneumothorax. The airways are patent. Lungs are clear without consolidation, interstitial disease, or suspicious nodules. LYMPH NODES: Thoracic lymph nodes are not enlarged. ABDOMEN:  LIVER: No hepatomegaly.  Smooth surface contour.  Mild fatty infiltration of the liver.  Calcified hepatosplenic granulomata.  BILE DUCTS: No intrahepatic or extrahepatic biliary ductal dilatation.  Mild pneumobilia.  GALLBLADDER: The gallbladder is absent. STOMACH: No abnormalities identified.  PANCREAS: No masses or ductal dilatation.  SPLEEN: No splenomegaly or focal splenic lesion.  ADRENAL GLANDS: No thickening or nodules.  KIDNEYS AND URETERS: Kidneys are normal in size and location.  No renal or ureteral calculi.  Mild  bilateral hydroureteronephrosis without a renal or ureteral calculus extending down to the urinary bladder, nonspecific in the setting of a distended urinary bladder  PELVIS:  BLADDER: Urinary bladder is distended.  Anterior bladder diverticulum noted  REPRODUCTIVE ORGANS: Prostate gland is top normal in size  BOWEL: No abnormalities identified.  VESSELS: No abnormalities identified.  Abdominal aorta is normal in caliber. Moderate plaque along the distal aorta.  PERITONEUM/RETROPERITONEUM/LYMPH NODES: No free fluid.  No pneumoperitoneum. Multiple enlarged retroperitoneal lymph nodes measuring up to 1.9 cm in the left periaortic region.  Additional enlarged bilateral pelvic lymph nodes measuring up to 2 cm along the left external iliac kierra chain.  ABDOMINAL WALL: No abnormalities identified. SOFT TISSUES: No abnormalities identified.  BONES: Extensive scattered sclerotic osseous metastatic lesions throughout the visualized bony structures.  No acute fracture. THORACIC SPINE: The alignment is anatomic.  There is no fracture or traumatic subluxation.  Extensive sclerotic osseous metastatic disease involving the thoracic spine. The vertebral body heights are well maintained.  Moderate disc space narrowing throughout the thoracic spine with accompanying anterior marginal osteophytes.  No significant central canal stenosis is demonstrated.  The neural foramina are patent throughout.  The paravertebral soft tissues are within normal limits. LUMBAR SPINE: The alignment is anatomic.  There is no fracture or traumatic subluxation.  Extensive sclerotic osseous metastatic disease involving the lumbar spine. The vertebral body heights are well maintained.  Moderate disc space narrowing at L5-S1 and mild disc space narrowing at L1-2 and L4-5.  No significant central canal stenosis is demonstrated.  Moderate bilateral neuroforaminal narrowing at L5-S1 and to a lesser extent at L3-4 and L4-5.  The paravertebral soft tissues are  within normal limits.     1. No CT evidence for traumatic injury. 2. Extensive scattered sclerotic osseous metastatic lesions throughout the visualized bony structures.  Primary consideration would BE prostate carcinoma. 3. Multiple enlarged retroperitoneal lymph nodes measuring up to 1.9 cm in the left periaortic region. Additional enlarged bilateral pelvic lymph nodes measuring up to 2 cm along the left external iliac kierra chain. Findings compatible with metastatic disease. 4. Mild bilateral hydroureteronephrosis without a renal or ureteral calculus extending down to the urinary bladder, nonspecific in the setting of a distended urinary bladder. 5. Cardiomegaly with severe coronary artery calcifications. 6. Mild hepatic steatosis. Signed by Sean Allan MD    CT cervical spine wo IV contrast    Result Date: 11/24/2024  Interpreted By:  Schoenberger, Joseph, STUDY: CT CERVICAL SPINE WO IV CONTRAST;  11/24/2024 3:12 pm   INDICATION: Signs/Symptoms:neck pain.     COMPARISON: None.   ACCESSION NUMBER(S): [id number]   ORDERING CLINICIAN: ALEKSANDER COLEMAN   TECHNIQUE: Axial CT images of the cervical spine are obtained. Axial, coronal and sagittal reconstructions are provided for review.   FINDINGS: There are multiple indistinctly marginated sclerotic lesions involving both posterior elements and vertebral bodies in the cervical and upper thoracic spine which are suggestive of blastic metastatic lesions. In a male of this age this would typically be associated with prostate cancer.   Fractures: There is no evidence for an acute fracture of the cervical spine.   Vertebral Alignment: Within normal limits.   Craniocervical Junction: The odontoid process and craniocervical junction are intact.   Vertebrae/Disc Spaces:  All cervical vertebra are maintained in height without vertebral body fracture. There is multilevel moderate degenerative disc narrowing and mild discogenic endplate spurring. There is severe degenerative  narrowing of the C5-C6 disc with some discogenic endplate and uncinate spurring resulting in foraminal encroachment.   Prevertebral/Paraspinal Soft Tissues: The prevertebral and paraspinal soft tissues are unremarkable.   Posterior elements are aligned normally without fracture or subluxation and relatively mild multilevel facet arthrosis.       No acute fracture or subluxation.   Multifocal blastic metastatic disease the most likely explanation for which is metastatic disease from prostate carcinoma.   MACRO: None   Signed by: Joseph Schoenberger 11/24/2024 3:28 PM Dictation workstation:   [id number]    CT head wo IV contrast    Result Date: 11/24/2024  Interpreted By:  Schoenberger, Joseph, STUDY: CT HEAD WO IV CONTRAST;  11/24/2024 3:12 pm   INDICATION: Signs/Symptoms:headache.     COMPARISON: None.   ACCESSION NUMBER(S): [id number]   ORDERING CLINICIAN: ALEKSANDER COLEMAN   TECHNIQUE: Noncontrast axial CT scan of head was performed. Angled reformats in brain and bone windows were generated. The images were reviewed in bone, brain, blood and soft tissue windows.   FINDINGS: CSF Spaces: Enlarged due to parenchymal volume loss. Normal configuration with attack basal cisterns. There is no extraaxial fluid collection.   Parenchyma:  The grey-white differentiation is intact. There is no mass effect or midline shift.  There is no intracranial hemorrhage.   Calvarium: The calvarium is unremarkable.   Paranasal sinuses and mastoids: Visualized paranasal sinuses and mastoids are clear.       No evidence of acute cortical infarct or intracranial hemorrhage.   No evidence of intracranial hemorrhage or displaced skull fracture.   MACRO: None   Signed by: Joseph Schoenberger 11/24/2024 3:24 PM Dictation workstation:   [id number]       Physical Exam   GENERAL: Alert, awake, frail elderly, cooperative, no distress  SKIN: Warm and dry.    HEENT:  mmm  LUNGS: Unlabored resps  CARDIO: RRR  GI: Soft, NTND, BS+, no rebound, no  guarding   :deferred  MS: generalized weakness  NEURO: A and O x 1-2. Speech is clear and can answer basic questions. Follows commands.   PSYCH: Pleasant, poor historian, calm    ASSESSMENT AND PLAN   Impression  This is an 89 y.o. year old who is hospitalized for Pain of metastatic malignancy.     Goals of Care: Quality of life focused care. Unsure about the any degree of  survival based care at this time. Will have a better idea after speaking w oncology.   Hopes: Family hopes to have a couple days to get a room ready at their house given the pt cannot live alone anymore.   Code Status: changed to  DNR and No Intubation and No ICU  Prognosis: poor  Life Expectancy: Months   Hospice Eligibility:  Metastatic cancer with bony involvement, cancer related pain    Plan:  Continue supportive care  Revisit GOC and dispo after oncology consultation   Continue successful pain mgmt per primary  Void trial per primary team when ready  Spiritual care consultation  Music therapy consultation  Living will, HCPOA, and [place] DNR copies made and placed on paper chart to be scanned in      Thank you for asking Palliative Care to assist with care of this patient.  Please contact us for additional questions or concerns.    Update provided to: The Primary team and the bedside nurse    MEDICAL COMPLEXITY    Medical complexity was high level due to due to new Metastatic cancer with progression from prior state which poses threat to life or bodily function and decision not to resuscitate due to poor prognosis. The time spent discussing ACP  was 30 minutes.     CONTACT INFORMATION   Kayy Morales CNP  Palliative Medicine  [facility] Secure chat

## 2024-11-25 NOTE — PROGRESS NOTES
Talib Siddiqui is a 89 y.o. male on day 1 of admission presenting with Pain of metastatic malignancy.      Subjective   The patient denies pain  Family at the bedside       Objective     Last Recorded Vitals  /60 (BP Location: Right arm, Patient Position: Lying)   Pulse 79   Temp 36.1 °C (97 °F) (Temporal)   Resp 18   Wt 78.3 kg (172 lb 9.9 oz)   SpO2 95%   Intake/Output last 3 Shifts:    Intake/Output Summary (Last 24 hours) at 11/25/2024 1532  Last data filed at 11/25/2024 1426  Gross per 24 hour   Intake 420 ml   Output 2000 ml   Net -1580 ml       Admission Weight  Weight: 83.9 kg (185 lb) (11/24/24 1252)    Daily Weight  11/24/24 : 78.3 kg (172 lb 9.9 oz)    Image Results  ECG 12 lead  Normal sinus rhythm  Nonspecific ST abnormality  Abnormal ECG  When compared with ECG of 28-JUN-2021 13:43,  Vent. rate has increased BY  27 BPM      Physical Exam  Skin:     Comments: Coccyx pressure ulcer          Relevant Results             Results for orders placed or performed during the hospital encounter of 11/24/24 (from the past 24 hours)   Urinalysis with Reflex Culture and Microscopic   Result Value Ref Range    Color, Urine Yellow Light-Yellow, Yellow, Dark-Yellow    Appearance, Urine Clear Clear    Specific Gravity, Urine 1.027 1.005 - 1.035    pH, Urine 5.5 5.0, 5.5, 6.0, 6.5, 7.0, 7.5, 8.0    Protein, Urine 100 (2+) (A) NEGATIVE, 10 (TRACE), 20 (TRACE) mg/dL    Glucose, Urine Normal Normal mg/dL    Blood, Urine 0.1 (1+) (A) NEGATIVE    Ketones, Urine NEGATIVE NEGATIVE mg/dL    Bilirubin, Urine NEGATIVE NEGATIVE    Urobilinogen, Urine Normal Normal mg/dL    Nitrite, Urine NEGATIVE NEGATIVE    Leukocyte Esterase, Urine NEGATIVE NEGATIVE   Extra Urine Gray Tube   Result Value Ref Range    Extra Tube Hold for add-ons.    Urinalysis Microscopic   Result Value Ref Range    WBC, Urine 1-5 1-5, NONE /HPF    RBC, Urine >20 (A) NONE, 1-2, 3-5 /HPF    Mucus, Urine FEW Reference range not established. /LPF   Blood  Culture    Specimen: Peripheral Venipuncture; Blood culture   Result Value Ref Range    Blood Culture Loaded on Instrument - Culture in progress    Blood Culture    Specimen: Peripheral Venipuncture; Blood culture   Result Value Ref Range    Blood Culture Loaded on Instrument - Culture in progress    CBC   Result Value Ref Range    WBC 13.5 (H) 4.4 - 11.3 x10*3/uL    nRBC 0.0 0.0 - 0.0 /100 WBCs    RBC 3.64 (L) 4.50 - 5.90 x10*6/uL    Hemoglobin 10.9 (L) 13.5 - 17.5 g/dL    Hematocrit 34.6 (L) 41.0 - 52.0 %    MCV 95 80 - 100 fL    MCH 29.9 26.0 - 34.0 pg    MCHC 31.5 (L) 32.0 - 36.0 g/dL    RDW 13.6 11.5 - 14.5 %    Platelets 186 150 - 450 x10*3/uL   Basic metabolic panel   Result Value Ref Range    Glucose 93 74 - 99 mg/dL    Sodium 142 136 - 145 mmol/L    Potassium 4.0 3.5 - 5.3 mmol/L    Chloride 104 98 - 107 mmol/L    Bicarbonate 28 21 - 32 mmol/L    Anion Gap 14 10 - 20 mmol/L    Urea Nitrogen 17 6 - 23 mg/dL    Creatinine 1.01 0.50 - 1.30 mg/dL    eGFR 72 >60 mL/min/1.73m*2    Calcium 8.1 (L) 8.6 - 10.3 mg/dL      Assessment/Plan          This patient has a urinary catheter   Reason for the urinary catheter remaining today? urinary retention/bladder outlet obstruction, acute or chronic          [place] BRITTANEY Siddiqui is a 89 y.o. male with history of basal cell carcinoma, CAD, chronic pain, CLL, DVT, HTN and dementia, who presented to the ED with complaints of severe pain of the back, groin and abdomen.       Newly diagnosed metastatic disease  Intractable pain  -CT abdomen shows extensive sclerotic metastatic lesions of the bones, primary possibly the prostate, mild hydronephrosis bilaterally, and multiple enlarged pelvic lymph nodes.  -Pain management with oxycodone and morphine for breakthrough pain  -Scheduled Tylenol  -Lidocaine patches for back pain  -Oncology consult to discuss treatment options  -PT/ OT eval  -Palliative care consult. Family would like to speak with oncology before making decisions regarding  hospice care. Will continue treating for pain. CODE STATUS changed to DNR- CCA.   -Family request a few days to get the their home together for the patients discharge.     Mild bilateral hydronephrosis  Urinary retention  Microscopic hematuria  -Prostate possibly primary  -Narayanan catheter  -Void trial in the morning  -Monitor intake and output  -Consider urology consult      Leukocytosis  -Unsure of source, possibly due to malignancy  -Trend CBC     Elevated troponin ACS unlikely  -EKG shows no ST elevation  -Telemetry monitoring  -Repeat EKG in the morning     DVT prophylaxis/ DVT hx  -SCDs  -There is no fill history for the Apixaban will verify  -Lovenox        Dispo: Will discharge the patient when medically stable. Pending PT/OT eval. and oncology consult.            CLARISSE Sow-CNP

## 2024-11-25 NOTE — PROGRESS NOTES
11/25/24 0853   Discharge Planning   Living Arrangements Alone  (son lives around the corner from patient)   Support Systems Children   Assistance Needed A&OX1-2 (per son waxes and wanes) independent with ADLs with cane; doesn't drive; room air baseline and currently room air   Type of Residence Private residence   Number of Stairs to Enter Residence 4   Number of Stairs Within Residence 28  (14up/14down)   Do you have animals or pets at home? No   Who is requesting discharge planning? Provider   Type of Post Acute Facility Services Other (Comment)  (Son has been planning for this time. He stated patient may come to his house with private nurse vs home with private nurse.)   Expected Discharge Disposition Home  (DC dispo pending workup. Home Health vs SNF vs Palliative/Hospice?)   Does the patient need discharge transport arranged? Yes   RoundTrip coordination needed? Yes   Has discharge transport been arranged? No   Financial Resource Strain   How hard is it for you to pay for the very basics like food, housing, medical care, and heating? Not hard   Housing Stability   In the last 12 months, was there a time when you were not able to pay the mortgage or rent on time? N   In the past 12 months, how many times have you moved where you were living? 1   At any time in the past 12 months, were you homeless or living in a shelter (including now)? N   Transportation Needs   In the past 12 months, has lack of transportation kept you from medical appointments or from getting medications? no   In the past 12 months, has lack of transportation kept you from meetings, work, or from getting things needed for daily living? No

## 2024-11-26 LAB
ANION GAP SERPL CALC-SCNC: 13 MMOL/L (ref 10–20)
ATRIAL RATE: 82 BPM
BUN SERPL-MCNC: 20 MG/DL (ref 6–23)
CALCIUM SERPL-MCNC: 8.2 MG/DL (ref 8.6–10.3)
CHLORIDE SERPL-SCNC: 105 MMOL/L (ref 98–107)
CO2 SERPL-SCNC: 27 MMOL/L (ref 21–32)
CREAT SERPL-MCNC: 0.94 MG/DL (ref 0.5–1.3)
EGFRCR SERPLBLD CKD-EPI 2021: 78 ML/MIN/1.73M*2
ERYTHROCYTE [DISTWIDTH] IN BLOOD BY AUTOMATED COUNT: 13.6 % (ref 11.5–14.5)
GLUCOSE SERPL-MCNC: 113 MG/DL (ref 74–99)
HCT VFR BLD AUTO: 35.4 % (ref 41–52)
HGB BLD-MCNC: 11.3 G/DL (ref 13.5–17.5)
MCH RBC QN AUTO: 30.4 PG (ref 26–34)
MCHC RBC AUTO-ENTMCNC: 31.9 G/DL (ref 32–36)
MCV RBC AUTO: 95 FL (ref 80–100)
NRBC BLD-RTO: 0 /100 WBCS (ref 0–0)
P AXIS: 67 DEGREES
P OFFSET: 190 MS
P ONSET: 122 MS
PLATELET # BLD AUTO: 164 X10*3/UL (ref 150–450)
POTASSIUM SERPL-SCNC: 3.9 MMOL/L (ref 3.5–5.3)
PR INTERVAL: 184 MS
Q ONSET: 214 MS
QRS COUNT: 13 BEATS
QRS DURATION: 94 MS
QT INTERVAL: 386 MS
QTC CALCULATION(BAZETT): 450 MS
QTC FREDERICIA: 428 MS
R AXIS: -29 DEGREES
RBC # BLD AUTO: 3.72 X10*6/UL (ref 4.5–5.9)
SODIUM SERPL-SCNC: 141 MMOL/L (ref 136–145)
T AXIS: 73 DEGREES
T OFFSET: 407 MS
VENTRICULAR RATE: 82 BPM
WBC # BLD AUTO: 13.4 X10*3/UL (ref 4.4–11.3)

## 2024-11-26 PROCEDURE — 36415 COLL VENOUS BLD VENIPUNCTURE: CPT | Performed by: INTERNAL MEDICINE

## 2024-11-26 PROCEDURE — 94760 N-INVAS EAR/PLS OXIMETRY 1: CPT

## 2024-11-26 PROCEDURE — 99232 SBSQ HOSP IP/OBS MODERATE 35: CPT | Performed by: INTERNAL MEDICINE

## 2024-11-26 PROCEDURE — 1200000002 HC GENERAL ROOM WITH TELEMETRY DAILY

## 2024-11-26 PROCEDURE — 2500000004 HC RX 250 GENERAL PHARMACY W/ HCPCS (ALT 636 FOR OP/ED): Performed by: NURSE PRACTITIONER

## 2024-11-26 PROCEDURE — 97161 PT EVAL LOW COMPLEX 20 MIN: CPT | Mod: GP

## 2024-11-26 PROCEDURE — 36415 COLL VENOUS BLD VENIPUNCTURE: CPT | Performed by: NURSE PRACTITIONER

## 2024-11-26 PROCEDURE — 85027 COMPLETE CBC AUTOMATED: CPT | Performed by: NURSE PRACTITIONER

## 2024-11-26 PROCEDURE — 2500000005 HC RX 250 GENERAL PHARMACY W/O HCPCS: Performed by: NURSE PRACTITIONER

## 2024-11-26 PROCEDURE — 80048 BASIC METABOLIC PNL TOTAL CA: CPT | Performed by: NURSE PRACTITIONER

## 2024-11-26 PROCEDURE — 84153 ASSAY OF PSA TOTAL: CPT | Mod: GEALAB | Performed by: INTERNAL MEDICINE

## 2024-11-26 PROCEDURE — 2500000002 HC RX 250 W HCPCS SELF ADMINISTERED DRUGS (ALT 637 FOR MEDICARE OP, ALT 636 FOR OP/ED): Performed by: NURSE PRACTITIONER

## 2024-11-26 PROCEDURE — 2500000004 HC RX 250 GENERAL PHARMACY W/ HCPCS (ALT 636 FOR OP/ED): Performed by: INTERNAL MEDICINE

## 2024-11-26 PROCEDURE — 2500000001 HC RX 250 WO HCPCS SELF ADMINISTERED DRUGS (ALT 637 FOR MEDICARE OP): Performed by: NURSE PRACTITIONER

## 2024-11-26 PROCEDURE — 97165 OT EVAL LOW COMPLEX 30 MIN: CPT | Mod: GO

## 2024-11-26 RX ORDER — EAR PLUGS
1 EACH OTIC (EAR) 2 TIMES DAILY
Status: DISCONTINUED | OUTPATIENT
Start: 2024-11-26 | End: 2024-11-27 | Stop reason: HOSPADM

## 2024-11-26 RX ADMIN — DONEPEZIL HYDROCHLORIDE 5 MG: 5 TABLET ORAL at 20:20

## 2024-11-26 RX ADMIN — CLOPIDOGREL 75 MG: 75 TABLET ORAL at 10:09

## 2024-11-26 RX ADMIN — ACETAMINOPHEN 975 MG: 325 TABLET ORAL at 10:09

## 2024-11-26 RX ADMIN — ENOXAPARIN SODIUM 40 MG: 40 INJECTION SUBCUTANEOUS at 20:20

## 2024-11-26 RX ADMIN — PANTOPRAZOLE SODIUM 40 MG: 40 TABLET, DELAYED RELEASE ORAL at 06:00

## 2024-11-26 RX ADMIN — PRAVASTATIN SODIUM 40 MG: 40 TABLET ORAL at 20:20

## 2024-11-26 RX ADMIN — LIDOCAINE 4% 1 PATCH: 40 PATCH TOPICAL at 10:09

## 2024-11-26 RX ADMIN — METOPROLOL SUCCINATE 50 MG: 50 TABLET, EXTENDED RELEASE ORAL at 10:09

## 2024-11-26 RX ADMIN — OXYCODONE 5 MG: 5 TABLET ORAL at 10:09

## 2024-11-26 RX ADMIN — HYDROMORPHONE HYDROCHLORIDE 0.4 MG: 0.5 INJECTION, SOLUTION INTRAMUSCULAR; INTRAVENOUS; SUBCUTANEOUS at 21:13

## 2024-11-26 RX ADMIN — EZETIMIBE 10 MG: 10 TABLET ORAL at 10:09

## 2024-11-26 RX ADMIN — ACETAMINOPHEN 975 MG: 325 TABLET ORAL at 18:10

## 2024-11-26 RX ADMIN — POLYETHYLENE GLYCOL 3350 17 G: 17 POWDER, FOR SOLUTION ORAL at 10:11

## 2024-11-26 RX ADMIN — OXYCODONE HYDROCHLORIDE 10 MG: 10 TABLET ORAL at 19:33

## 2024-11-26 RX ADMIN — Medication 3 MG: at 20:20

## 2024-11-26 ASSESSMENT — PAIN - FUNCTIONAL ASSESSMENT
PAIN_FUNCTIONAL_ASSESSMENT: 0-10

## 2024-11-26 ASSESSMENT — COGNITIVE AND FUNCTIONAL STATUS - GENERAL
MOVING FROM LYING ON BACK TO SITTING ON SIDE OF FLAT BED WITH BEDRAILS: A LITTLE
DRESSING REGULAR LOWER BODY CLOTHING: A LOT
PERSONAL GROOMING: A LITTLE
TOILETING: A LITTLE
DRESSING REGULAR UPPER BODY CLOTHING: A LITTLE
MOBILITY SCORE: 21
WALKING IN HOSPITAL ROOM: A LITTLE
CLIMB 3 TO 5 STEPS WITH RAILING: A LITTLE
DRESSING REGULAR LOWER BODY CLOTHING: A LITTLE
DAILY ACTIVITIY SCORE: 20
HELP NEEDED FOR BATHING: A LITTLE
DRESSING REGULAR UPPER BODY CLOTHING: A LOT
DAILY ACTIVITIY SCORE: 17
MOVING TO AND FROM BED TO CHAIR: A LITTLE
HELP NEEDED FOR BATHING: A LITTLE
TOILETING: A LITTLE
TURNING FROM BACK TO SIDE WHILE IN FLAT BAD: A LITTLE
MOBILITY SCORE: 21
DRESSING REGULAR LOWER BODY CLOTHING: A LITTLE
WALKING IN HOSPITAL ROOM: A LITTLE
MOBILITY SCORE: 18
DRESSING REGULAR UPPER BODY CLOTHING: A LITTLE
WALKING IN HOSPITAL ROOM: A LITTLE
STANDING UP FROM CHAIR USING ARMS: A LITTLE
DAILY ACTIVITIY SCORE: 20
TOILETING: A LITTLE
CLIMB 3 TO 5 STEPS WITH RAILING: A LOT
HELP NEEDED FOR BATHING: A LITTLE
CLIMB 3 TO 5 STEPS WITH RAILING: A LOT

## 2024-11-26 ASSESSMENT — PAIN SCALES - GENERAL
PAINLEVEL_OUTOF10: 0 - NO PAIN
PAINLEVEL_OUTOF10: 5 - MODERATE PAIN
PAINLEVEL_OUTOF10: 7
PAINLEVEL_OUTOF10: 0 - NO PAIN
PAINLEVEL_OUTOF10: 0 - NO PAIN
PAINLEVEL_OUTOF10: 9
PAINLEVEL_OUTOF10: 4
PAINLEVEL_OUTOF10: 5 - MODERATE PAIN
PAINLEVEL_OUTOF10: 0 - NO PAIN

## 2024-11-26 ASSESSMENT — PAIN DESCRIPTION - LOCATION: LOCATION: SHOULDER

## 2024-11-26 ASSESSMENT — ACTIVITIES OF DAILY LIVING (ADL)
BATHING_ASSISTANCE: MINIMAL
ADLS_ADDRESSED: NO
ADL_ASSISTANCE: INDEPENDENT
ADL_ASSISTANCE: INDEPENDENT

## 2024-11-26 ASSESSMENT — PAIN DESCRIPTION - ORIENTATION: ORIENTATION: LEFT

## 2024-11-26 NOTE — CONSULTS
Wound Care Consult     Visit Date: 11/26/2024      Patient Name: Talib Siddiqui         MRN: [id number]           YOB: 1935     Reason for Consult:   Skin changes reported      Wound History:   Unknown     Pertinent Labs:   Albumin   Date Value Ref Range Status   11/24/2024 4.0 3.4 - 5.0 g/dL Final     Albumin (Data Conversion)   Date Value Ref Range Status   09/19/2019 20.9 mg/dL Final     ALBUMIN (MG/L) IN URINE   Date Value Ref Range Status   05/31/2018 127.4 mg/L Final       Wound Assessment:  Wound 11/26/24 Ankle Dorsal;Left (Active)   Wound Image   11/26/24 0912   Shape Oval 11/26/24 0924   Wound Length (cm) 0.7 cm 11/26/24 0924   Wound Width (cm) 0.5 cm 11/26/24 0924   Wound Surface Area (cm^2) 0.35 cm^2 11/26/24 0924   Drainage Description None 11/26/24 0924   Drainage Amount None 11/26/24 0924   Dressing Changed New 11/26/24 0924       Wound Team Summary Assessment:  Delightful, pleasant 89 y/o CM who shares repeating stories is seen to be able to freely turn/reposition self for comfort.  He does have very minor pale pink blanchable discoloration to central and right aspect of sacrum / medial buttocks with small amount dry desquamation.  Skin intact, no wound is seen.  A protective sacral Mepilex is in place. Goal:  continued protection.  Mepilex with Bordeaux Butt paste twice daily.     Skin to heels and other bony prominences is intact with the exception of a dry sanguineous scab to superior aspect left heel which is 0.7 x 0.5 cm with slight local redness.  Location suggests possible shoe wear related friction as a cause.  Goal:  protect and promote moist healing.  Recommend:  Xeroform and Mepilex, change every other day.       Wound Team Plan:   Left upper heel -- Xeroform and Mepilex, change every other day.  Encourage continued frequent repositioning and assist as needed.      Tim Green RN, WCC, DWC  11/26/2024  9:54 AM

## 2024-11-26 NOTE — PROGRESS NOTES
Occupational Therapy    Evaluation    Patient Name: Talib Siddiqui  MRN: [id number]  Department: [address]  Room: [address] [place] [address]  Today's Date: 11/26/2024  Time Calculation  Start Time: 1306  Stop Time: 1318  Time Calculation (min): 12 min    Assessment  IP OT Assessment  OT Assessment: Pt presents with decreased ADL performance, decreased functional mobility, decreased endurance. Continued skilled OT recommended to maximize pt safety and independence.  Prognosis: Good  Barriers to Discharge: None  Evaluation/Treatment Tolerance: Patient tolerated treatment well  Medical Staff Made Aware: Yes  End of Session Communication: Bedside nurse  End of Session Patient Position: Up in chair, Alarm on  Plan:  Treatment Interventions: ADL retraining, Functional transfer training, Endurance training, UE strengthening/ROM  OT Frequency: 3 times per week  OT Discharge Recommendations: Moderate intensity level of continued care  Equipment Recommended upon Discharge: Wheeled walker  OT Recommended Transfer Status: Assist of 1  OT - OK to Discharge: Yes (per OT POC)    Subjective   Current Problem:  1. Pain of metastatic malignancy        2. Urinary retention        3. 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)          General:  General  Reason for Referral: 89 yo male referred to OT or impaired ADLs/mobility  Referred By: CLARISSE Sow-CNP  Past Medical History Relevant to Rehab: Dementia, chronic lymphocytic leukemia (remission x15 years), arthritis, lumbar radiculopathy, anemia, carpal tunnel release  Co-Treatment: PT  Co-Treatment Reason: To maximize pt safety and functional outcomes 2/2 cognitive deficits  Prior to Session Communication: Bedside nurse  Patient Position Received: Bed, 3 rail up, Alarm on  General Comment: Pt pleasantly confused, cooperative with therapy evaluations  Precautions:  Medical Precautions: Fall precautions    Vital Sign (Past 2hrs)                Pain:  Pain Assessment  Pain Assessment: 0-10  0-10  "(Numeric) Pain Score: 0 - No pain    Objective   Cognition:  Overall Cognitive Status: Impaired at baseline  Orientation Level: Disoriented to place, Disoriented to time, Disoriented to situation           Home Living:  Type of Home: House  Lives With: Alone  Home Adaptive Equipment: Cane  Home Layout: Two level, Stairs to alternate level with rails  Alternate Level Stairs-Number of Steps: 14  Home Access: Stairs to enter with rails  Entrance Stairs-Number of Steps: 4   Prior Function:  Level of [place]: Independent with ADLs and functional transfers, Needs assistance with homemaking  Receives Help From: Family  ADL Assistance: Independent  Homemaking Assistance: Needs assistance (son visits daily to assist with meds, housework)  Ambulatory Assistance: Independent (intermittently uses a cane \"just in case\")  IADL History:  Mode of Transportation: Family  ADL:  Eating Assistance: Stand by  Grooming Assistance: Stand by  Bathing Assistance: Minimal  UE Dressing Assistance: Independent  LE Dressing Assistance: Moderate  Toileting Assistance with Device: Minimal  Functional Assistance: Minimal  ADL Comments: ADL performance anticipated d/t current clinical presentation  Activity Tolerance:  Endurance: Tolerates 10 - 20 min exercise with multiple rests  Bed Mobility/Transfers: Bed Mobility  Bed Mobility: Yes  Bed Mobility 1  Bed Mobility 1: Supine to sitting  Level of Assistance 1: Close supervision    Transfers  Transfer: Yes  Transfer 1  Transfer From 1: Sit to  Transfer to 1: Stand  Technique 1: Sit to stand, Stand to sit  Transfer Device 1: Walker  Transfer Level of Assistance 1: Minimum assistance      Functional Mobility:  Functional Mobility  Functional Mobility Performed: Yes  Functional Mobility 1  Surface 1: Level tile  Device 1: Rolling walker  Assistance 1: Contact guard  Comments 1: Ambulated short household distance with good balance, cues for walker management  Sitting Balance:  Static Sitting " Balance  Static Sitting-Balance Support: Feet supported  Static Sitting-Level of Assistance: Distant supervision  Standing Balance:  Static Standing Balance  Static Standing-Balance Support: Bilateral upper extremity supported  Static Standing-Level of Assistance: Contact guard     IADL's:   Mode of Transportation: Family    Strength:  Strength Comments: AMA asher 4/5 based on functional observations     Coordination:  Movements are Fluid and Coordinated: Yes   Hand Function:  Hand Function  Gross Grasp: Functional  Coordination: Functional  Extremities: RUE   RUE : Within Functional Limits and LUE   LUE: Within Functional Limits    Outcome Measures: [facility] Daily Activity  Putting on and taking off regular lower body clothing: A lot  Bathing (including washing, rinsing, drying): A little  Putting on and taking off regular upper body clothing: A lot  Toileting, which includes using toilet, bedpan or urinal: A little  Taking care of personal grooming such as brushing teeth: A little  Eating Meals: None  Daily Activity - Total Score: 17      Education Documentation  Body Mechanics, taught by Dean Dwyer OT at 11/26/2024  1:42 PM.  Learner: Patient  Readiness: Acceptance  Method: Explanation  Response: Verbalizes Understanding    Precautions, taught by Dean Dwyer OT at 11/26/2024  1:42 PM.  Learner: Patient  Readiness: Acceptance  Method: Explanation  Response: Verbalizes Understanding    ADL Training, taught by Dean Dwyer OT at 11/26/2024  1:42 PM.  Learner: Patient  Readiness: Acceptance  Method: Explanation  Response: Verbalizes Understanding    Body Mechanics, taught by Dean Dwyer OT at 11/26/2024  1:39 PM.  Learner: Patient  Readiness: Acceptance  Method: Explanation  Response: Verbalizes Understanding    Precautions, taught by Dean Dwyer OT at 11/26/2024  1:39 PM.  Learner: Patient  Readiness: Acceptance  Method: Explanation  Response: Verbalizes Understanding    ADL  Training, taught by Dean Dwyer OT at 11/26/2024  1:39 PM.  Learner: Patient  Readiness: Acceptance  Method: Explanation  Response: Verbalizes Understanding    Education Comments  No comments found.      Goals:   Encounter Problems       Encounter Problems (Active)       OT Goals       Pt will complete [id number] transfers using LRD in preparation for ADLs with mod independence       Start:  11/26/24    Expected End:  12/10/24            Pt will demo LE ADL completion with modified independence, using AE if needed.        Start:  11/26/24    Expected End:  12/10/24            Pt will increase endurance to tolerate 15min of OOB activity with no more than 1 rest break in order to increase ability to engage in ADL completion.        Start:  11/26/24    Expected End:  12/10/24            Pt will tolerate 10min stand during functional task completion with no more than 1 rest break in order to increase endurance for functional task completion.        Start:  11/26/24    Expected End:  12/10/24            Pt will complete meghan hygiene and clothing management during toileting ADL with supervision, using DME and adaptive strategies as neccesary        Start:  11/26/24    Expected End:  12/10/24

## 2024-11-26 NOTE — PROGRESS NOTES
"Music Therapy Note    Talib Siddiqui was referred by FAVIAN Villarreal.    Therapy Session  Referral Type: New referral this admission  Visit Type: New visit  Session Start Time: 1220  Session End Time: 1240  Intervention Delivery: In-person  Conflict of Service: None  Family Present for Session: None     Pre-assessment  Unable to Assess Reason: Cognitive limitation  Mood/Affect: Calm, Appropriate, Confused         Treatment/Interventions  Music Therapy Interventions: Assessment  Interruption: No  Patient Fell Asleep at End of Session: No    Post-assessment  Unable to Assess Reason: Did not provide expressive therapy intervention  Continue Visiting: Yes  Total Session Time (min): 20 minutes    Narrative  Assessment Detail: Pt was sitting upright in bed, listening to music on his TV when approached by MT. Pt was pleasant and easily engaged. He was oriented to self and not able to answer many questions posed by MT. He was somewhat open to services, but confused as to MT's role and purpose, stating that he was listening to music currently. He shared enjoyment of the music and began talking about the scenery on the TV while it was playing. 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 Pt repeated the same stories several times throughout the session. Pt was grateful for the opportunity to talk, although no expressive therapy was provided on this occasion. Pt denied any other needs upon MT's departure.  Follow-up: MT will follow up as able and appropriate.    Education Documentation  No documentation found.          "

## 2024-11-26 NOTE — ASSESSMENT & PLAN NOTE
Patient is comfortable on current regimen  Continue Dilaudid and Oxycodone.  Will transition to oral   Oncology consulted.  Palliative following    Hydronephrosis/Urinary retention:  fu has been removed. Will monitor renal function and urine output. Check PVR later today    Hypertension:  fair control. Continue metoprolol. Hold cozaar. Monitor renal function    Hyperlipodemia: continue statin      GERD: On PPI    Leukocytosis:  no additional sign of infection. Continue to monitor.    Dementia: Continue aricept

## 2024-11-26 NOTE — PROGRESS NOTES
Talib Siddiqui is a 89 y.o. male on day 2 of admission presenting with Pain of metastatic malignancy.      Subjective   Mr. Siddiqui reports that he is doing okay. He noted that pain is better controlled. He denied fever and chills. No nausea or vomiting. No diarrhea. He is aware of plans to continue pain control and await additional recommendation from oncology team    Objective     Last Recorded Vitals  /66 (BP Location: Left arm, Patient Position: Lying)   Pulse 78   Temp 36.5 °C (97.7 °F) (Temporal)   Resp 16   Wt 78.3 kg (172 lb 9.9 oz)   SpO2 95%   Intake/Output last 3 Shifts:    Intake/Output Summary (Last 24 hours) at 11/26/2024 1229  Last data filed at 11/26/2024 0930  Gross per 24 hour   Intake 485 ml   Output 376 ml   Net 109 ml       Admission Weight  Weight: 83.9 kg (185 lb) (11/24/24 1252)    Daily Weight  11/24/24 : 78.3 kg (172 lb 9.9 oz)    Image Results  ECG 12 lead  Normal sinus rhythm  Nonspecific ST abnormality  Abnormal ECG  When compared with ECG of 28-JUN-2021 13:43,  Vent. rate has increased BY  27 BPM      Physical Exam  Constitutional:       Appearance: Normal appearance.   HENT:      Head: Normocephalic.      Nose: Nose normal.      Mouth/Throat:      Mouth: Mucous membranes are moist.   Eyes:      Pupils: Pupils are equal, round, and reactive to light.   Cardiovascular:      Rate and Rhythm: Normal rate and regular rhythm.   Pulmonary:      Effort: Pulmonary effort is normal.      Breath sounds: Normal breath sounds.   Abdominal:      General: Bowel sounds are normal.      Palpations: Abdomen is soft.   Musculoskeletal:         General: Normal range of motion.      Cervical back: Neck supple.   Skin:     General: Skin is warm.   Neurological:      General: No focal deficit present.      Mental Status: He is alert.         Relevant Results               Assessment/Plan          Assessment & Plan  Pain of metastatic malignancy  Patient is comfortable on current regimen  Continue  Dilaudid and Oxycodone.  Will transition to oral   Oncology consulted.  Palliative following    Hydronephrosis/Urinary retention:  fu has been removed. Will monitor renal function and urine output. Check PVR later today    Hypertension:  fair control. Continue metoprolol. Hold cozaar. Monitor renal function    Hyperlipodemia: continue statin      GERD: On PPI    Leukocytosis:  no additional sign of infection. Continue to monitor.    Dementia: Continue aricept      Horace Pulido MD

## 2024-11-26 NOTE — PROGRESS NOTES
Physical Therapy    Physical Therapy Evaluation    Patient Name: Talib Siddiqui  MRN: [id number]  Department: [address]  Room: [address] [place] [address]  Today's Date: 11/26/2024   Time Calculation  Start Time: 1305  Stop Time: 1318  Time Calculation (min): 13 min    Assessment/Plan   PT Assessment  PT Assessment Results: Decreased strength, Impaired balance, Decreased mobility, Decreased cognition, Decreased safety awareness  Rehab Prognosis: Fair  Barriers to Discharge: Cognitive deficits, home environment (lives alone, 2 stories)  Evaluation/Treatment Tolerance: Patient tolerated treatment well  Medical Staff Made Aware: Yes  End of Session Communication: Bedside nurse  End of Session Patient Position: Up in chair, Alarm on (pt verbalized correct use of call bell, placed on lap; RN notified of pt position)    Assessment:   Pt is an 89 y/o male presenting for PT eval this date. Pt is A&O x1 with history of dementia. Pt currently requires Shona for transfers and ambulation to maintain safety. Pt is being followed by palliative care for sx management and discharge planning. Per EMR, pt's son is planning on bringing pt to his home. Pt is currently functioning below his baseline and could benefit from short-term SNF stay, pending pt/family wishes. Otherwise, recommend 24/7 supervision due to cognitive deficits.       IP OR SWING BED PT PLAN  Inpatient or Swing Bed: Inpatient  PT Plan  Treatment/Interventions: Bed mobility, Transfer training, Gait training, Stair training, Therapeutic exercise, Therapeutic activity, Positioning  PT Plan: Ongoing PT  PT Frequency: 3 times per week  PT Discharge Recommendations: Moderate intensity level of continued care, 24 hr supervision due to cognition  Equipment Recommended upon Discharge: Wheeled walker  PT Recommended Transfer Status: Assist x1 (with FWW)  PT - OK to Discharge: Yes    Subjective   General Visit Information:  General  Reason for Referral: Pt is an 89 yo male who presented to [facility] on  "11/24/24 with multiple complaints: abdominal pain, back pain, shoulder pain, and chest pain. 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 PT consult placed for impaired mobility.  Past Medical History Relevant to Rehab: Dementia, chronic lymphocytic leukemia (remission x15 years), arthritis, lumbar radiculopathy, anemia, carpal tunnel release  Family/Caregiver Present: No  Co-Treatment: OT  Co-Treatment Reason: To maximize pt safety and functional outcomes 2/2 cognitive deficits  Prior to Session Communication: Bedside nurse  Patient Position Received: Bed, 3 rail up, Alarm on  General Comment: Pt pleasantly agreeable to therapy evals. Reports his L shoulder is feeling better since using the patch.  Home Living:  Home Living  Type of Home: House  Lives With: Alone  Home Adaptive Equipment: Cane  Home Layout: Two level, Stairs to alternate level with rails  Alternate Level Stairs-Number of Steps: 14  Home Access: Stairs to enter with rails  Entrance Stairs-Number of Steps: 4  Home Living Comments: History obtained from EMR. Pt is A&O x1 and poor historian  Prior Level of Function:  Prior Function Per Pt/Caregiver Report  Level of [place]: Independent with ADLs and functional transfers, Needs assistance with homemaking  Receives Help From: Family  ADL Assistance: Independent  Homemaking Assistance: Needs assistance (son visits daily to assist with meds, housework)  Ambulatory Assistance: Independent (intermittently uses a cane \"just in case\")  Precautions:  Precautions  Medical Precautions: Fall precautions  Precautions Comment: Tele     Vital Signs (Past 2hrs)                 Objective   Pain:  Pain Assessment  Pain Assessment: 0-10  0-10 (Numeric) Pain Score: 0 - No pain  Pain Type: Chronic pain  Pain Location: Shoulder  Pain Orientation: Left  Pain Frequency: Intermittent  Pain Interventions: Repositioned, Ambulation/increased activity  Response to Interventions: " Content/relaxed  Cognition:  Cognition  Overall Cognitive Status: Impaired at baseline  Orientation Level: Disoriented to place, Disoriented to time, Disoriented to situation    General Assessments:                  Activity Tolerance  Endurance: Tolerates 10 - 20 min exercise with multiple rests    Sensation  Light Touch: No apparent deficits    Strength  Strength Comments: Functional strength observed to be at least 3/5 BLE  Static Sitting Balance  Static Sitting-Balance Support: Feet supported, Bilateral upper extremity supported  Static Sitting-Level of Assistance: Close supervision  Static Sitting-Comment/Number of Minutes: EOB    Static Standing Balance  Static Standing-Balance Support: Bilateral upper extremity supported  Static Standing-Level of Assistance: Contact guard  Static Standing-Comment/Number of Minutes: with FWW  Functional Assessments:  Bed Mobility  Bed Mobility: Yes  Bed Mobility 1  Bed Mobility 1: Supine to sitting  Level of Assistance 1: Minimum assistance  Bed Mobility Comments 1: HOB elevated, HHA to achieve upright sitting    Transfers  Transfer: Yes  Transfer 1  Technique 1: Sit to stand  Transfer Device 1: Walker  Transfer Level of Assistance 1: Minimum assistance  Trials/Comments 1: VC for hand placement, upright posture  Transfers 2  Technique 2: Stand to sit  Transfer Device 2: Walker  Transfer Level of Assistance 2: Minimum assistance  Trials/Comments 2: VC for hand placement, controlled descent (difficult)    Ambulation/Gait Training  Ambulation/Gait Training Performed: Yes  Ambulation/Gait Training 1  Surface 1: Level tile  Device 1: Rolling walker  Assistance 1: Minimum assistance  Quality of Gait 1: Decreased step length, Forward flexed posture, Soft knee(s)  Comments/Distance (ft) 1: 180' in hallway; VC for safety with change of direction  Extremity/Trunk Assessments:  RLE   RLE : Within Functional Limits  LLE   LLE : Within Functional Limits  Outcome Measures:  [facility] Basic  Mobility  Turning from your back to your side while in a flat bed without using bedrails: A little  Moving from lying on your back to sitting on the side of a flat bed without using bedrails: A little  Moving to and from bed to chair (including a wheelchair): A little  Standing up from a chair using your arms (e.g. wheelchair or bedside chair): A little  To walk in hospital room: A little  Climbing 3-5 steps with railing: A little  Basic Mobility - Total Score: 18    Encounter Problems       Encounter Problems (Active)       Mobility       Patient will ambulate community distance of 250' with FWW and supervision.        Start:  11/26/24    Expected End:  12/10/24               PT Transfers       Patient will transfer to and from sit to supine at independent level without use of bed rails.       Start:  11/26/24    Expected End:  12/10/24            Patient will transfer sit to and from stand with FWW and supervision.        Start:  11/26/24    Expected End:  12/10/24               Pain - Adult              Education Documentation  Body Mechanics, taught by Amalia Willett, PT at 11/26/2024  1:40 PM.  Learner: Patient  Readiness: Acceptance  Method: Explanation, Demonstration  Response: Needs Reinforcement    Mobility Training, taught by Amalia Willett PT at 11/26/2024  1:40 PM.  Learner: Patient  Readiness: Acceptance  Method: Explanation, Demonstration  Response: Needs Reinforcement    Education Comments  Role of acute care PT, POC, discharge planning, hospital safety (use of call light, staff assist for all OOB mobility)

## 2024-11-27 VITALS
OXYGEN SATURATION: 95 % | BODY MASS INDEX: 27.09 KG/M2 | WEIGHT: 172.62 LBS | TEMPERATURE: 97.7 F | RESPIRATION RATE: 16 BRPM | HEIGHT: 67 IN | HEART RATE: 66 BPM | DIASTOLIC BLOOD PRESSURE: 83 MMHG | SYSTOLIC BLOOD PRESSURE: 122 MMHG

## 2024-11-27 LAB
ANION GAP SERPL CALC-SCNC: 14 MMOL/L (ref 10–20)
BUN SERPL-MCNC: 18 MG/DL (ref 6–23)
CALCIUM SERPL-MCNC: 8.2 MG/DL (ref 8.6–10.3)
CHLORIDE SERPL-SCNC: 103 MMOL/L (ref 98–107)
CO2 SERPL-SCNC: 26 MMOL/L (ref 21–32)
CREAT SERPL-MCNC: 0.71 MG/DL (ref 0.5–1.3)
EGFRCR SERPLBLD CKD-EPI 2021: 88 ML/MIN/1.73M*2
ERYTHROCYTE [DISTWIDTH] IN BLOOD BY AUTOMATED COUNT: 14 % (ref 11.5–14.5)
GLUCOSE SERPL-MCNC: 106 MG/DL (ref 74–99)
HCT VFR BLD AUTO: 38.1 % (ref 41–52)
HGB BLD-MCNC: 11.7 G/DL (ref 13.5–17.5)
MCH RBC QN AUTO: 30.3 PG (ref 26–34)
MCHC RBC AUTO-ENTMCNC: 30.7 G/DL (ref 32–36)
MCV RBC AUTO: 99 FL (ref 80–100)
NRBC BLD-RTO: 0 /100 WBCS (ref 0–0)
PLATELET # BLD AUTO: 176 X10*3/UL (ref 150–450)
POTASSIUM SERPL-SCNC: 3.9 MMOL/L (ref 3.5–5.3)
PSA SERPL-MCNC: 126.61 NG/ML
RBC # BLD AUTO: 3.86 X10*6/UL (ref 4.5–5.9)
SODIUM SERPL-SCNC: 139 MMOL/L (ref 136–145)
WBC # BLD AUTO: 14.2 X10*3/UL (ref 4.4–11.3)

## 2024-11-27 PROCEDURE — 51701 INSERT BLADDER CATHETER: CPT

## 2024-11-27 PROCEDURE — 2500000001 HC RX 250 WO HCPCS SELF ADMINISTERED DRUGS (ALT 637 FOR MEDICARE OP): Performed by: NURSE PRACTITIONER

## 2024-11-27 PROCEDURE — 99239 HOSP IP/OBS DSCHRG MGMT >30: CPT | Performed by: INTERNAL MEDICINE

## 2024-11-27 PROCEDURE — 2500000004 HC RX 250 GENERAL PHARMACY W/ HCPCS (ALT 636 FOR OP/ED): Performed by: INTERNAL MEDICINE

## 2024-11-27 PROCEDURE — 2500000002 HC RX 250 W HCPCS SELF ADMINISTERED DRUGS (ALT 637 FOR MEDICARE OP, ALT 636 FOR OP/ED): Performed by: NURSE PRACTITIONER

## 2024-11-27 PROCEDURE — 2500000004 HC RX 250 GENERAL PHARMACY W/ HCPCS (ALT 636 FOR OP/ED): Performed by: NURSE PRACTITIONER

## 2024-11-27 PROCEDURE — 2500000005 HC RX 250 GENERAL PHARMACY W/O HCPCS: Performed by: NURSE PRACTITIONER

## 2024-11-27 PROCEDURE — 99221 1ST HOSP IP/OBS SF/LOW 40: CPT | Performed by: INTERNAL MEDICINE

## 2024-11-27 PROCEDURE — 36415 COLL VENOUS BLD VENIPUNCTURE: CPT | Performed by: NURSE PRACTITIONER

## 2024-11-27 PROCEDURE — 80048 BASIC METABOLIC PNL TOTAL CA: CPT | Performed by: NURSE PRACTITIONER

## 2024-11-27 PROCEDURE — 85027 COMPLETE CBC AUTOMATED: CPT | Performed by: NURSE PRACTITIONER

## 2024-11-27 RX ORDER — BICALUTAMIDE 50 MG/1
50 TABLET, FILM COATED ORAL DAILY
Status: DISCONTINUED | OUTPATIENT
Start: 2024-11-27 | End: 2024-11-27 | Stop reason: HOSPADM

## 2024-11-27 RX ORDER — OXYCODONE HYDROCHLORIDE 5 MG/1
5 TABLET ORAL EVERY 4 HOURS PRN
Qty: 21 TABLET | Refills: 0 | Status: SHIPPED | OUTPATIENT
Start: 2024-11-27

## 2024-11-27 RX ADMIN — OXYCODONE HYDROCHLORIDE 10 MG: 10 TABLET ORAL at 17:36

## 2024-11-27 RX ADMIN — ACETAMINOPHEN 975 MG: 325 TABLET ORAL at 02:30

## 2024-11-27 RX ADMIN — BICALUTAMIDE 50 MG: 50 TABLET ORAL at 13:35

## 2024-11-27 RX ADMIN — METOPROLOL SUCCINATE 50 MG: 50 TABLET, EXTENDED RELEASE ORAL at 09:02

## 2024-11-27 RX ADMIN — OXYCODONE 5 MG: 5 TABLET ORAL at 02:50

## 2024-11-27 RX ADMIN — POLYETHYLENE GLYCOL 3350 17 G: 17 POWDER, FOR SOLUTION ORAL at 09:02

## 2024-11-27 RX ADMIN — PANTOPRAZOLE SODIUM 40 MG: 40 TABLET, DELAYED RELEASE ORAL at 06:33

## 2024-11-27 RX ADMIN — ACETAMINOPHEN 975 MG: 325 TABLET ORAL at 09:03

## 2024-11-27 RX ADMIN — EZETIMIBE 10 MG: 10 TABLET ORAL at 09:02

## 2024-11-27 RX ADMIN — LIDOCAINE 4% 1 PATCH: 40 PATCH TOPICAL at 09:02

## 2024-11-27 RX ADMIN — CLOPIDOGREL 75 MG: 75 TABLET ORAL at 09:02

## 2024-11-27 RX ADMIN — ACETAMINOPHEN 975 MG: 325 TABLET ORAL at 17:36

## 2024-11-27 ASSESSMENT — COGNITIVE AND FUNCTIONAL STATUS - GENERAL
MOBILITY SCORE: 22
TOILETING: A LITTLE
HELP NEEDED FOR BATHING: A LITTLE
DRESSING REGULAR LOWER BODY CLOTHING: A LITTLE
DAILY ACTIVITIY SCORE: 19
PERSONAL GROOMING: A LITTLE
DRESSING REGULAR UPPER BODY CLOTHING: A LITTLE
WALKING IN HOSPITAL ROOM: A LITTLE
CLIMB 3 TO 5 STEPS WITH RAILING: A LITTLE

## 2024-11-27 ASSESSMENT — PAIN SCALES - GENERAL
PAINLEVEL_OUTOF10: 7
PAINLEVEL_OUTOF10: 3
PAINLEVEL_OUTOF10: 3

## 2024-11-27 ASSESSMENT — PAIN DESCRIPTION - LOCATION: LOCATION: SHOULDER

## 2024-11-27 ASSESSMENT — ACTIVITIES OF DAILY LIVING (ADL): LACK_OF_TRANSPORTATION: NO

## 2024-11-27 ASSESSMENT — PAIN - FUNCTIONAL ASSESSMENT: PAIN_FUNCTIONAL_ASSESSMENT: 0-10

## 2024-11-27 ASSESSMENT — PAIN DESCRIPTION - ORIENTATION: ORIENTATION: LEFT

## 2024-11-27 NOTE — CONSULTS
Reason For Consult  89-year-old male with a history of coronary artery disease chronic lymphocytic leukemia and dementia.  CT findings consistent with multifocal blastic metastatic disease.  Multiple enlarged retroperitoneal lymph nodes.  Bilateral hydronephrosis without calculus.  Cardiomegaly and severe CAD.  Oncology consulted in regard to the potential malignant metastatic bony disease and its management.    History Of Present Illness  Talib Siddiqui is a 89 y.o. male who has a PMH of basal cell carcinoma, CAD, chronic pain, CLL, DVT, HTN and dementia. He presented to the ED with  a chief complaint of severe pain of the groin. He is oriented x 1-2 and history is limited by this. Per family he also complained of severe back pain as well. Symptoms started 2 days prior to coming in and was interfering with his sleep. He is receiving 975mg QID Tylenol and Lidocaine patch. He has not required any PRN doses of medication. He states that he feels fine and hopes to go home today.     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      Past Medical History  He has a past medical history of Anesthesia of skin (12/18/2014), Arthritis, Body mass index (BMI) 34.0-34.9, adult (01/02/2020), Calculus of bile duct without cholangitis or cholecystitis without obstruction (07/09/2014), Cancer (Multi), Cellulitis of unspecified part of limb (10/16/2020), Dementia, Encounter for immunization (01/02/2020), Encounter for immunization (08/10/2021), Esophageal obstruction, Nonscarring hair loss, unspecified (02/10/2016), Other conditions influencing health status (04/25/2019), Personal history of diseases of the blood and blood-forming organs and certain disorders involving the immune mechanism (06/09/2022), Personal history of  other diseases of the digestive system, Personal history of other diseases of the musculoskeletal system and connective tissue (05/31/2018), Personal history of other diseases of the nervous system and sense organs (03/13/2015), Personal history of other infectious and parasitic diseases (06/09/2022), Polyp of colon (05/31/2017), Radiculopathy, lumbar region (12/18/2014), Rash and other nonspecific skin eruption (02/19/2022), Tinnitus, Unspecified abdominal pain (06/26/2014), and Unspecified malignant neoplasm of skin of unspecified part of face (01/02/2020).    Surgical History  He has a past surgical history that includes Tonsillectomy (10/10/2013); Appendectomy (10/10/2013); Carpal tunnel release (05/05/2015); Cholecystectomy (12/17/2014); Coronary artery bypass graft (06/23/2014); Colonoscopy (06/24/2014); Other surgical history (06/02/2020); Other surgical history (06/23/2014); and Gallbladder surgery.     Social History  He reports that he has quit smoking. His smoking use included cigarettes. He has never used smokeless tobacco. He reports that he does not drink alcohol and does not use drugs.    Family History  Family History   Problem Relation Name Age of Onset    Dementia Mother      Colon cancer Brother      Alzheimer's disease Brother      Sarcoidosis Brother      Heart disease Son      Heart disease Other          Allergies  Tricyclic antidepressants and tricyclic compounds, Amitriptyline, Donepezil, Lisinopril, Penicillins, Sulfa (sulfonamide antibiotics), Meloxicam, Piperacillin-tazobactam, and Sulfamethoxazole    Review of Systems  Review of systems as per primary team.  Patient definitely with quite significant to moderate to severe dementia.  Also showing signs of deconditioning.  Performance status significantly reduced PS 1-2.     Physical Exam  Physical examination as per primary team.     Last Recorded Vitals  Blood pressure 126/67, pulse 64, temperature 36.4 °C (97.5 °F), temperature source  "Temporal, resp. rate 16, height 1.702 m (5' 7.01\"), weight 78.3 kg (172 lb 9.9 oz), SpO2 95%.    Relevant Results  PSA resulted.  126.     Assessment/Plan     89-year-old male with multiple comorbid morbidities as stated above.  CONSULTED for recommendations in regard to CT findings suggestive of metastatic bony disease.  PSA greater than 100 highly suggestive of metastatic prostate cancer.  Given patient's poor performance status I would recommend bicalutamide 50 mg p.o. daily.  This medication is in the palliative setting in the event of side effects of significant extent this may be discontinued.    I spent 60 minutes in the professional and overall care of this patient.      Dyana Mays MD    "

## 2024-11-27 NOTE — PROGRESS NOTES
11/27/24 0652   Discharge Planning   Living Arrangements Alone  (son lives around the corner from patient)   Support Systems Children   Assistance Needed A&OX1-2 (per son waxes and wanes) independent with ADLs with cane; doesn't drive; room air baseline and currently room air   Type of Residence Private residence   Number of Stairs to Enter Residence 4   Number of Stairs Within Residence 28  (14up/14down)   Do you have animals or pets at home? No   Who is requesting discharge planning? Provider   Type of Post Acute Facility Services Other (Comment)  (Son has been planning for this time. He stated patient may come to his house with private nurse vs home with private nurse.)   Expected Discharge Disposition Home  (DC dispo pending workup. Home Health vs SNF vs Palliative/Hospice?)   Does the patient need discharge transport arranged? Yes   RoundTrip coordination needed? Yes   Has discharge transport been arranged? No   Financial Resource Strain   How hard is it for you to pay for the very basics like food, housing, medical care, and heating? Not hard   Housing Stability   In the last 12 months, was there a time when you were not able to pay the mortgage or rent on time? N   In the past 12 months, how many times have you moved where you were living? 1   At any time in the past 12 months, were you homeless or living in a shelter (including now)? N   Transportation Needs   In the past 12 months, has lack of transportation kept you from medical appointments or from getting medications? no   In the past 12 months, has lack of transportation kept you from meetings, work, or from getting things needed for daily living? No

## 2024-11-27 NOTE — DISCHARGE SUMMARY
Discharge Diagnosis  Pain of metastatic malignancy    Issues Requiring Follow-Up  Hospice care  Narayanan management    Discharge Meds     Medication List      START taking these medications     oxyCODONE 5 mg immediate release tablet; Commonly known as: Roxicodone;   Take 1 tablet (5 mg) by mouth every 4 hours if needed for severe pain (7 -   10).     CHANGE how you take these medications     metoprolol succinate XL 50 mg 24 hr tablet; Commonly known as:   Toprol-XL; What changed: Another medication with the same name was   removed. Continue taking this medication, and follow the directions you   see here.     CONTINUE taking these medications     Centrum Silver 0.4 mg-300 mcg- 250 mcg; Generic drug: multivitamin with   minerals iron-free   clopidogrel 75 mg tablet; Commonly known as: Plavix   diclofenac sodium 1 % gel; Commonly known as: Voltaren; Apply 4.5 inches   (4 g) topically 4 times a day as needed (back pain).   donepezil 5 mg tablet; Commonly known as: Aricept; Take 1 tablet (5 mg)   by mouth once daily at bedtime.   DULoxetine 30 mg DR capsule; Commonly known as: Cymbalta; Take 1 capsule   (30 mg) by mouth once daily. Do not crush or chew.   ezetimibe 10 mg tablet; Commonly known as: Zetia   Fish OiL 1,200 (144-216) mg capsule; Generic drug: omega 3-dha-epa-fish   oil   PAIN RELIEF CREAM TOP   pravastatin 40 mg tablet; Commonly known as: Pravachol     STOP taking these medications     Eliquis 2.5 mg tablet; Generic drug: apixaban   HYDROcodone-acetaminophen 5-325 mg tablet; Commonly known as: Norco   losartan 50 mg tablet; Commonly known as: Cozaar       Test Results Pending At Discharge  Pending Labs       Order Current Status    Blood Culture Preliminary result    Blood Culture Preliminary result            Hospital Course  Mr. Siddiqui was admitted with severe abdominal, back and  groin pain. Work up unfortunately showed malignancy with mets to the bone. Primary source was concerning to be prostate as his  PSA was greater than 100. He was having urinary retention prompting fu catheter insertion. Void trial was recommended at discharge. However, he was still having difficulty urinating. Complicating care is patient's history of dementia. He is unable to self cath. So decision was made to reinsert fu. This was explained to patient's family.  Oncology saw patient during hospitalization and recommended Casodex. They will prescribe med for patient.   His pain was better controlled while in the hospital and same pain regimen was given to patient at the time of discharge.     He was follow up with hospice at home who will help manage fu bag.    Time spent in discharge of patient is greater than 30 minutes.     Pertinent Physical Exam At Time of Discharge  Physical Exam  Constitutional:       Appearance: Normal appearance.   HENT:      Head: Normocephalic.      Right Ear: Tympanic membrane normal.      Nose: Nose normal.      Mouth/Throat:      Mouth: Mucous membranes are moist.   Eyes:      Pupils: Pupils are equal, round, and reactive to light.   Cardiovascular:      Rate and Rhythm: Normal rate and regular rhythm.      Pulses: Normal pulses.   Pulmonary:      Effort: Pulmonary effort is normal.      Breath sounds: Normal breath sounds.   Abdominal:      General: Bowel sounds are normal.      Palpations: Abdomen is soft.   Musculoskeletal:         General: Normal range of motion.   Neurological:      Mental Status: He is alert. Mental status is at baseline.   Psychiatric:         Behavior: Behavior normal.         Outpatient Follow-Up  Future Appointments   Date Time Provider Department Center   2/3/2025 11:30 AM Kota Garrett MD [facility]         Horace Pulido MD

## 2024-11-27 NOTE — PROGRESS NOTES
11/27/24 1434   Discharge Planning   Expected Discharge Disposition [facility]  (Referral sent to [facility]; awaiting meeting time)

## 2024-11-27 NOTE — PROGRESS NOTES
"PALLIATIVE DAILY PROGRESS NOTE                                                                                                                                             SUBJECTIVE                                                                                                                                                                                                                                                    Pt feeling some pain in the mid back but declines pain meds a this time.  Wants to go home.     OBJECTIVE                                                                                                                                                                                                                                                      Physical Exam   GENERAL: Alert, awake, frail elderly, cooperative, no distress  SKIN: Warm and dry.    HEENT:  mmm  LUNGS: Unlabored resps  CARDIO: RRR  GI: Soft, NTND, BS+, no rebound, no guarding   :deferred  MS: generalized weakness, tender thoracic back at the spine  NEURO: A and O x 1-2. Speech is clear and can answer basic questions. Follows commands.   PSYCH: Pleasant, poor historian, calm    Last Recorded Vitals  Visit Vitals  /67 (BP Location: Left arm, Patient Position: Lying)   Pulse 64   Temp 36.4 °C (97.5 °F) (Temporal)   Resp 16   Ht 1.702 m (5' 7.01\")   Wt 78.3 kg (172 lb 9.9 oz)   SpO2 95%   BMI 27.03 kg/m²   Smoking Status Former   BSA 1.92 m²        Relevant Results  Scheduled medications  acetaminophen, 975 mg, oral, q8h  [Held by provider] apixaban, 2.5 mg, oral, BID  clopidogrel, 75 mg, oral, Daily  donepezil, 5 mg, oral, Nightly  enoxaparin, 40 mg, subcutaneous, q24h  ezetimibe, 10 mg, oral, Daily  influenza, 0.5 mL, intramuscular, During hospitalization  lidocaine, 1 patch, transdermal, Daily  [Held by provider] losartan, 50 mg, oral, Daily  melatonin, 3 mg, oral, Nightly  metoprolol succinate XL, 50 mg, oral, " Daily  pantoprazole, 40 mg, oral, Daily before breakfast  polyethylene glycol, 17 g, oral, Daily  pravastatin, 40 mg, oral, Nightly  zinc oxide, 1 Application, Topical, BID      Continuous medications     PRN medications  PRN medications: HYDROmorphone, nitroglycerin, ondansetron, oxyCODONE, oxyCODONE    Results for orders placed or performed during the hospital encounter of 11/24/24 (from the past 24 hours)   Prostate Specific Antigen   Result Value Ref Range    Prostate Specific .61 (H) <=4.00 ng/mL   Basic metabolic panel   Result Value Ref Range    Glucose 106 (H) 74 - 99 mg/dL    Sodium 139 136 - 145 mmol/L    Potassium 3.9 3.5 - 5.3 mmol/L    Chloride 103 98 - 107 mmol/L    Bicarbonate 26 21 - 32 mmol/L    Anion Gap 14 10 - 20 mmol/L    Urea Nitrogen 18 6 - 23 mg/dL    Creatinine 0.71 0.50 - 1.30 mg/dL    eGFR 88 >60 mL/min/1.73m*2    Calcium 8.2 (L) 8.6 - 10.3 mg/dL   CBC   Result Value Ref Range    WBC 14.2 (H) 4.4 - 11.3 x10*3/uL    nRBC 0.0 0.0 - 0.0 /100 WBCs    RBC 3.86 (L) 4.50 - 5.90 x10*6/uL    Hemoglobin 11.7 (L) 13.5 - 17.5 g/dL    Hematocrit 38.1 (L) 41.0 - 52.0 %    MCV 99 80 - 100 fL    MCH 30.3 26.0 - 34.0 pg    MCHC 30.7 (L) 32.0 - 36.0 g/dL    RDW 14.0 11.5 - 14.5 %    Platelets 176 150 - 450 x10*3/uL        No results found. }    ASSESSMENT AND PLAN   Impression  This is an 89 y.o. year old here with metastatic cancer suspected to be of prostatic origin with many areas of mets including bony involvment who is hospitalized for pain of metastatic malignancy.      Goals of Care: Family wanting to transition to comfort and quality of life focused care at [place]  Hopes: Family hopes to have a couple days to get a room ready at their house given the pt cannot live alone anymore.   Code Status:  DNR and No Intubation and No ICU  Prognosis: poor  Life Expectancy: Months   Hospice Eligibility:  Metastatic cancer with bony involvement, cancer related pain     Plan:  -Continue supportive  care  -Consultation and conversation with family +oncologist on 11/26 and PSA was ordered; PSA result 126  -Family has decided to have hospice care at home given the spread to the bones with related pain  -[facility] as family is hoping to meet this afternoon  -Continue scheduled Tylenol , lidocaine patch, and PRN oxycodone for pain   -Prophylactic bowel regimen in place with miralax; consider senna if needed  -Defer plan for urine retention  w intermittent straight cath vs fu to primary +hospice team  -Continue Spiritual care visits  -Continue Music therapy      Thank you for asking Palliative Care to assist with care of this patient.  Please contact us for additional questions or concerns.     Update provided to: The Primary team, TCC team,  and the bedside nurse    MEDICAL COMPLEXITY    Medical complexity was high level due to due to newly dx'd metastatic cancer with progression from prior state which poses threat to life or bodily function and decision not to resuscitate due to poor prognosis.    CONTACT INFORMATION   Kayy Morales CNP  Palliative Medicine  [facility] Secure chat

## 2024-11-27 NOTE — SIGNIFICANT EVENT
Dr Wright aware of bladder scan volume. Wants to encourage 500 ml oral fluids and re-bladder scan in 6 hours

## 2024-11-27 NOTE — NURSING NOTE
0743: Pt sitting on side of bed eating breakfast. Conversation is pleasant, confused. Pt states he has back pain but unable to state pain level. Does not want pain meds at this time. No needs at this time.    1330: Pt ambulated to bathroom, unable to urinate. Pt has not urinated this shift. Bladder scan showed 90mL. Attending aware. Encouraging fluids.

## 2024-11-27 NOTE — CARE PLAN
The patient's goals for the shift include  pt will get some sleep     The clinical goals for the shift include pt will remain fall free      Problem: Safety - Adult  Goal: Free from fall injury  Outcome: Progressing     Problem: Pain - Adult  Goal: Verbalizes/displays adequate comfort level or baseline comfort level  Outcome: Progressing

## 2024-11-27 NOTE — PROGRESS NOTES
"PALLIATIVE DAILY PROGRESS NOTE                                                                                                                                             SUBJECTIVE                                                                                                                                                                                                                                                    Pt feeling ok. Wants to go home. C/o some mild  back pain intermittently.      OBJECTIVE                                                                                                                                                                                                                                                      Physical Exam   GENERAL: Alert, awake, frail elderly, cooperative, no distress  SKIN: Warm and dry.    HEENT:  mmm  LUNGS: Unlabored resps  CARDIO: RRR  GI: Soft, NTND, BS+, no rebound, no guarding   :deferred  MS: generalized weakness  NEURO: A and O x 1-2. Speech is clear and can answer basic questions. Follows commands.   PSYCH: Pleasant, poor historian, calm    Last Recorded Vitals  Visit Vitals  /67 (BP Location: Left arm, Patient Position: Lying)   Pulse 72   Temp 36.4 °C (97.5 °F) (Temporal)   Resp 16   Ht 1.702 m (5' 7.01\")   Wt 78.3 kg (172 lb 9.9 oz)   SpO2 95%   BMI 27.03 kg/m²   Smoking Status Former   BSA 1.92 m²        Relevant Results  Scheduled medications  acetaminophen, 975 mg, oral, q8h  [Held by provider] apixaban, 2.5 mg, oral, BID  clopidogrel, 75 mg, oral, Daily  donepezil, 5 mg, oral, Nightly  enoxaparin, 40 mg, subcutaneous, q24h  ezetimibe, 10 mg, oral, Daily  influenza, 0.5 mL, intramuscular, During hospitalization  lidocaine, 1 patch, transdermal, Daily  [Held by provider] losartan, 50 mg, oral, Daily  melatonin, 3 mg, oral, Nightly  metoprolol succinate XL, 50 mg, oral, Daily  pantoprazole, 40 mg, oral, Daily before " breakfast  polyethylene glycol, 17 g, oral, Daily  pravastatin, 40 mg, oral, Nightly  zinc oxide, 1 Application, Topical, BID      Continuous medications     PRN medications  PRN medications: HYDROmorphone, nitroglycerin, ondansetron, oxyCODONE, oxyCODONE    Results for orders placed or performed during the hospital encounter of 11/24/24 (from the past 24 hours)   Prostate Specific Antigen   Result Value Ref Range    Prostate Specific .61 (H) <=4.00 ng/mL   Basic metabolic panel   Result Value Ref Range    Glucose 106 (H) 74 - 99 mg/dL    Sodium 139 136 - 145 mmol/L    Potassium 3.9 3.5 - 5.3 mmol/L    Chloride 103 98 - 107 mmol/L    Bicarbonate 26 21 - 32 mmol/L    Anion Gap 14 10 - 20 mmol/L    Urea Nitrogen 18 6 - 23 mg/dL    Creatinine 0.71 0.50 - 1.30 mg/dL    eGFR 88 >60 mL/min/1.73m*2    Calcium 8.2 (L) 8.6 - 10.3 mg/dL   CBC   Result Value Ref Range    WBC 14.2 (H) 4.4 - 11.3 x10*3/uL    nRBC 0.0 0.0 - 0.0 /100 WBCs    RBC 3.86 (L) 4.50 - 5.90 x10*6/uL    Hemoglobin 11.7 (L) 13.5 - 17.5 g/dL    Hematocrit 38.1 (L) 41.0 - 52.0 %    MCV 99 80 - 100 fL    MCH 30.3 26.0 - 34.0 pg    MCHC 30.7 (L) 32.0 - 36.0 g/dL    RDW 14.0 11.5 - 14.5 %    Platelets 176 150 - 450 x10*3/uL        No results found. }    ASSESSMENT AND PLAN   Impression  This is an 89 y.o. 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      Goals of Care: Family wanting to transition to comfort and quality of life focused care at [facility]  Hopes: Family hopes to have a couple days to get a room ready at their house given the pt cannot live alone anymore.   Code Status:  DNR and No Intubation and No ICU  Prognosis: poor  Life Expectancy: Months   Hospice Eligibility:  Metastatic cancer with bony involvement, cancer related pain     Plan:  -Continue supportive care  -Consultation and conversation with family +oncologist on  11/26 and PSA was ordered  -Revisit [facility] after family has some time to think about the info that what was provided in the onc visit  -Continue Spiritual care visits  -Continue Music therapy   -DC home  tentatively planned for 11/27 [facility] v hospice      Thank you for asking Palliative Care to assist with care of this patient.  Please contact us for additional questions or concerns.     Update provided to: The Primary team and the bedside nurse    MEDICAL COMPLEXITY    Medical complexity was high level due to due to newly dx'd metastatic cancer with progression from prior state which poses threat to life or bodily function and decision not to resuscitate due to poor prognosis.    CONTACT INFORMATION   Kayy Morales CNP  Palliative Medicine  [facility]

## 2024-11-28 DIAGNOSIS — C61 PROSTATE CANCER (MULTI): Primary | ICD-10-CM

## 2024-11-28 RX ORDER — BICALUTAMIDE 50 MG/1
50 TABLET, FILM COATED ORAL DAILY
Qty: 30 TABLET | Refills: 11 | Status: SHIPPED | OUTPATIENT
Start: 2024-11-28 | End: 2025-11-28

## 2024-11-28 NOTE — PROGRESS NOTES
Patient ID: Talib Siddiqui is a 89 y.o. male.  Referring Physician: No referring provider defined for this encounter.  Primary Care Provider: FAVIAN Atwood  Visit Type:  Follow Up       Subjective    HPI    Review of Systems - Oncology     Objective   BSA: There is no height or weight on file to calculate BSA.  There were no vitals taken for this visit.     has a past medical history of Anesthesia of skin (12/18/2014), Arthritis, Body mass index (BMI) 34.0-34.9, adult (01/02/2020), Calculus of bile duct without cholangitis or cholecystitis without obstruction (07/09/2014), Cancer (Multi), Cellulitis of unspecified part of limb (10/16/2020), Dementia, Encounter for immunization (01/02/2020), Encounter for immunization (08/10/2021), Esophageal obstruction, Nonscarring hair loss, unspecified (02/10/2016), Other conditions influencing health status (04/25/2019), Personal history of diseases of the blood and blood-forming organs and certain disorders involving the immune mechanism (06/09/2022), Personal history of other diseases of the digestive system, Personal history of other diseases of the musculoskeletal system and connective tissue (05/31/2018), Personal history of other diseases of the nervous system and sense organs (03/13/2015), Personal history of other infectious and parasitic diseases (06/09/2022), Polyp of colon (05/31/2017), Radiculopathy, lumbar region (12/18/2014), Rash and other nonspecific skin eruption (02/19/2022), Tinnitus, Unspecified abdominal pain (06/26/2014), and Unspecified malignant neoplasm of skin of unspecified part of face (01/02/2020).   has a past surgical history that includes Tonsillectomy (10/10/2013); Appendectomy (10/10/2013); Carpal tunnel release (05/05/2015); Cholecystectomy (12/17/2014); Coronary artery bypass graft (06/23/2014); Colonoscopy (06/24/2014); Other surgical history (06/02/2020); Other surgical history (06/23/2014); and Gallbladder surgery.  Family  History   Problem Relation Name Age of Onset    Dementia Mother      Colon cancer Brother      Alzheimer's disease Brother      Sarcoidosis Brother      Heart disease Son      Heart disease Other       Oncology History    No history exists.       [place] BR[place]ANE[place] Siddiqui  reports that he has quit smoking. His smoking use included cigarettes. He has never used smokeless tobacco.  He  reports no history of alcohol use.  He  reports no history of drug use.    Physical Exam    WBC   Date/Time Value Ref Range Status   11/27/2024 05:47 AM 14.2 (H) 4.4 - 11.3 x10*3/uL Final   11/26/2024 05:52 AM 13.4 (H) 4.4 - 11.3 x10*3/uL Final   11/25/2024 06:26 AM 13.5 (H) 4.4 - 11.3 x10*3/uL Final     nRBC   Date Value Ref Range Status   11/27/2024 0.0 0.0 - 0.0 /100 WBCs Final   11/26/2024 0.0 0.0 - 0.0 /100 WBCs Final   11/25/2024 0.0 0.0 - 0.0 /100 WBCs Final     RBC   Date Value Ref Range Status   11/27/2024 3.86 (L) 4.50 - 5.90 x10*6/uL Final   11/26/2024 3.72 (L) 4.50 - 5.90 x10*6/uL Final   11/25/2024 3.64 (L) 4.50 - 5.90 x10*6/uL Final     Hemoglobin   Date Value Ref Range Status   11/27/2024 11.7 (L) 13.5 - 17.5 g/dL Final   11/26/2024 11.3 (L) 13.5 - 17.5 g/dL Final   11/25/2024 10.9 (L) 13.5 - 17.5 g/dL Final     Hematocrit   Date Value Ref Range Status   11/27/2024 38.1 (L) 41.0 - 52.0 % Final   11/26/2024 35.4 (L) 41.0 - 52.0 % Final   11/25/2024 34.6 (L) 41.0 - 52.0 % Final     MCV   Date/Time Value Ref Range Status   11/27/2024 05:47 AM 99 80 - 100 fL Final   11/26/2024 05:52 AM 95 80 - 100 fL Final   11/25/2024 06:26 AM 95 80 - 100 fL Final     MCH   Date/Time Value Ref Range Status   11/27/2024 05:47 AM 30.3 26.0 - 34.0 pg Final   11/26/2024 05:52 AM 30.4 26.0 - 34.0 pg Final   11/25/2024 06:26 AM 29.9 26.0 - 34.0 pg Final     MCHC   Date/Time Value Ref Range Status   11/27/2024 05:47 AM 30.7 (L) 32.0 - 36.0 g/dL Final   11/26/2024 05:52 AM 31.9 (L) 32.0 - 36.0 g/dL Final   11/25/2024 06:26 AM 31.5 (L) 32.0 - 36.0 g/dL Final      RDW   Date/Time Value Ref Range Status   11/27/2024 05:47 AM 14.0 11.5 - 14.5 % Final   11/26/2024 05:52 AM 13.6 11.5 - 14.5 % Final   11/25/2024 06:26 AM 13.6 11.5 - 14.5 % Final     Platelets   Date/Time Value Ref Range Status   11/27/2024 05:47  150 - 450 x10*3/uL Final   11/26/2024 05:52  150 - 450 x10*3/uL Final   11/25/2024 06:26  150 - 450 x10*3/uL Final     MPV   Date/Time Value Ref Range Status   09/17/2021 09:19 AM 9.3 7.0 - 12.6 CU Final   03/18/2021 10:45 AM 9.4 7.0 - 12.6 CU Final   09/15/2020 11:48 AM 9.7 7.0 - 12.6 CU Final     Neutrophils %   Date/Time Value Ref Range Status   11/24/2024 01:37 PM 51.0 40.0 - 80.0 % Final   02/10/2023 11:01 AM 50.1 40.0 - 80.0 % Final   06/28/2021 01:16 PM 56.9 40.0 - 80.0 % Final   06/14/2021 03:26 PM 58.4 40.0 - 80.0 % Final     Immature Granulocytes %, Automated   Date/Time Value Ref Range Status   11/24/2024 01:37 PM 0.3 0.0 - 0.9 % Final     Comment:     Immature Granulocyte Count (IG) includes promyelocytes, myelocytes and metamyelocytes but does not include bands. Percent differential counts (%) should be interpreted in the context of the absolute cell counts (cells/UL).   02/10/2023 11:01 AM 0.3 0.0 - 0.9 % Final     Comment:      Immature Granulocyte Count (IG) includes promyelocytes,    myelocytes and metamyelocytes but does not include bands.   Percent differential counts (%) should be interpreted in the   context of the absolute cell counts (cells/L).     06/28/2021 01:16 PM 0.1 0.0 - 0.9 % Final     Comment:      Immature Granulocyte Count (IG) includes promyelocytes,    myelocytes and metamyelocytes but does not include bands.   Percent differential counts (%) should be interpreted in the   context of the absolute cell counts (cells/L).     06/14/2021 03:26 PM 0.3 0.0 - 0.9 % Final     Comment:      Immature Granulocyte Count (IG) includes promyelocytes,    myelocytes and metamyelocytes but does not include bands.   Percent  differential counts (%) should be interpreted in the   context of the absolute cell counts (cells/L).       Lymphocytes %   Date/Time Value Ref Range Status   11/24/2024 01:37 PM 42.0 13.0 - 44.0 % Final   02/10/2023 11:01 AM 39.5 13.0 - 44.0 % Final   09/17/2021 09:19 AM 38.90 20 - 40 % Final   06/28/2021 01:16 PM 32.4 13.0 - 44.0 % Final   06/14/2021 03:26 PM 30.4 13.0 - 44.0 % Final   03/18/2021 10:45 AM 29.60 20 - 40 % Final   09/15/2020 11:48 AM 32.30 20 - 40 % Final     Monocytes %   Date/Time Value Ref Range Status   11/24/2024 01:37 PM 6.0 2.0 - 10.0 % Final   02/10/2023 11:01 AM 8.0 2.0 - 10.0 % Final   09/17/2021 09:19 AM 8.80 (H) 0 - 8 % Final   06/28/2021 01:16 PM 9.3 2.0 - 10.0 % Final   06/14/2021 03:26 PM 9.7 2.0 - 10.0 % Final   03/18/2021 10:45 AM 8.30 (H) 0 - 8 % Final   09/15/2020 11:48 AM 9.70 (H) 0 - 8 % Final     Eosinophils %   Date/Time Value Ref Range Status   11/24/2024 01:37 PM 0.3 0.0 - 6.0 % Final   02/10/2023 11:01 AM 1.5 0.0 - 6.0 % Final   06/28/2021 01:16 PM 1.0 0.0 - 6.0 % Final   06/14/2021 03:26 PM 0.8 0.0 - 6.0 % Final     Basophils %   Date/Time Value Ref Range Status   11/24/2024 01:37 PM 0.4 0.0 - 2.0 % Final   02/10/2023 11:01 AM 0.6 0.0 - 2.0 % Final   09/17/2021 09:19 AM 0.60 0 - 1 % Final   06/28/2021 01:16 PM 0.3 0.0 - 2.0 % Final     Neutrophils Absolute   Date/Time Value Ref Range Status   11/24/2024 01:37 PM 9.14 (H) 1.60 - 5.50 x10*3/uL Final     Comment:     Percent differential counts (%) should be interpreted in the context of the absolute cell counts (cells/uL).   02/10/2023 11:01 AM 4.34 1.60 - 5.50 x10E9/L Final   09/17/2021 09:19 AM 3.60 1.8 - 7.7 K/UL Final   06/28/2021 01:16 PM 4.43 1.60 - 5.50 x10E9/L Final     Immature Granulocytes Absolute, Automated   Date/Time Value Ref Range Status   11/24/2024 01:37 PM 0.06 0.00 - 0.50 x10*3/uL Final   09/17/2021 09:19 AM 0.02 0.0 - 0.1 K/UL Final   03/18/2021 10:45 AM 0.02 0.0 - 0.1 K/UL Final   09/15/2020 11:48 AM  0.02 0.0 - 0.1 K/UL Final     Lymphocytes Absolute   Date/Time Value Ref Range Status   11/24/2024 01:37 PM 7.53 (H) 0.80 - 3.00 x10*3/uL Final   02/10/2023 11:01 AM 3.42 (H) 0.80 - 3.00 x10E9/L Final   09/17/2021 09:19 AM 2.79 1.2 - 3.2 K/UL Final   06/28/2021 01:16 PM 2.52 0.80 - 3.00 x10E9/L Final     Monocytes Absolute   Date/Time Value Ref Range Status   11/24/2024 01:37 PM 1.07 (H) 0.05 - 0.80 x10*3/uL Final   02/10/2023 11:01 AM 0.69 0.05 - 0.80 x10E9/L Final   09/17/2021 09:19 AM 0.63 0 - 0.8 K/UL Final   06/28/2021 01:16 PM 0.72 0.05 - 0.80 x10E9/L Final     Eosinophils Absolute   Date/Time Value Ref Range Status   11/24/2024 01:37 PM 0.05 0.00 - 0.40 x10*3/uL Final   02/10/2023 11:01 AM 0.13 0.00 - 0.40 x10E9/L Final   09/17/2021 09:19 AM 0.10 0 - 0.45 K/UL Final   06/28/2021 01:16 PM 0.08 0.00 - 0.40 x10E9/L Final     Basophils Absolute   Date/Time Value Ref Range Status   11/24/2024 01:37 PM 0.08 0.00 - 0.10 x10*3/uL Final   02/10/2023 11:01 AM 0.05 0.00 - 0.10 x10E9/L Final   09/17/2021 09:19 AM 0.04 0.00 - 0.22 K/UL Final   06/28/2021 01:16 PM 0.02 0.00 - 0.10 x10E9/L Final       Assessment/Plan      Prostate cancer IV     Diagnoses and all orders for this visit:  Prostate cancer (Multi)  -     bicalutamide (Casodex) 50 mg tablet; Take 1 tablet (50 mg total) by mouth once daily.  Take at the same time every day.           Dyana Mays MD

## 2024-11-29 ENCOUNTER — PATIENT OUTREACH (OUTPATIENT)
Dept: PRIMARY CARE | Facility: CLINIC | Age: 89
End: 2024-11-29
Payer: MEDICARE

## 2024-11-29 LAB
BACTERIA BLD CULT: NORMAL
BACTERIA BLD CULT: NORMAL

## 2024-12-04 NOTE — PROGRESS NOTES
Following with hospice provider. 
TCM RN Outreach   Spoke to patient's son who confirmed he was discharged home under hospice care. They are anticipating that he will be followed by hospice provider now, awaiting his first visit hospice visit today.  Encouraged to call if we can be of any assistance.   TCM program not opened.   
no

## 2024-12-07 DIAGNOSIS — F03.90 UNSPECIFIED DEMENTIA, UNSPECIFIED SEVERITY, WITHOUT BEHAVIORAL DISTURBANCE, PSYCHOTIC DISTURBANCE, MOOD DISTURBANCE, AND ANXIETY: ICD-10-CM

## 2024-12-10 RX ORDER — DONEPEZIL HYDROCHLORIDE 5 MG/1
5 TABLET, FILM COATED ORAL NIGHTLY
Qty: 90 TABLET | Refills: 0 | Status: SHIPPED | OUTPATIENT
Start: 2024-12-10

## 2025-02-03 ENCOUNTER — APPOINTMENT (OUTPATIENT)
Dept: PAIN MEDICINE | Facility: CLINIC | Age: 89
End: 2025-02-03
Payer: MEDICARE